# Patient Record
Sex: MALE | Race: WHITE | NOT HISPANIC OR LATINO | Employment: PART TIME | URBAN - METROPOLITAN AREA
[De-identification: names, ages, dates, MRNs, and addresses within clinical notes are randomized per-mention and may not be internally consistent; named-entity substitution may affect disease eponyms.]

---

## 2017-02-09 ENCOUNTER — HOSPITAL ENCOUNTER (OUTPATIENT)
Dept: LAB | Facility: HOSPITAL | Age: 47
Setting detail: SPECIMEN
Discharge: HOME | End: 2017-02-09
Attending: INTERNAL MEDICINE | Admitting: INTERNAL MEDICINE

## 2017-02-09 LAB
ALBUMIN SERPL-MCNC: 4.3 G/DL (ref 3.5–4.8)
ALBUMIN/GLOB SERPL: 1.5 {RATIO} (ref 1–1.7)
ALP SERPL-CCNC: 82 IU/L (ref 32–91)
ALT SERPL-CCNC: 65 IU/L (ref 17–63)
ANION GAP SERPL CALC-SCNC: 12.4 MMOL/L (ref 10–20)
AST SERPL-CCNC: 35 IU/L (ref 15–41)
BILIRUB SERPL-MCNC: 1.1 MG/DL (ref 0.3–1.2)
BUN SERPL-MCNC: 15 MG/DL (ref 8–20)
BUN/CREAT SERPL: 15 (ref 6.2–20.3)
CALCIUM SERPL-MCNC: 9.5 MG/DL (ref 8.9–10.3)
CHLORIDE SERPL-SCNC: 100 MMOL/L (ref 101–111)
CHOLEST SERPL-MCNC: 209 MG/DL
CHOLEST/HDLC SERPL: 5.1 {RATIO}
CONV CO2: 31 MMOL/L (ref 22–32)
CONV LDL CHOLESTEROL DIRECT: 121 MG/DL (ref 0–100)
CONV TOTAL PROTEIN: 7.1 G/DL (ref 6.1–7.9)
CREAT UR-MCNC: 1 MG/DL (ref 0.7–1.2)
GLOBULIN UR ELPH-MCNC: 2.8 G/DL (ref 2.5–3.8)
GLUCOSE SERPL-MCNC: 240 MG/DL (ref 65–99)
HDLC SERPL-MCNC: 41 MG/DL
LDLC/HDLC SERPL: 3 {RATIO}
LIPID INTERPRETATION: ABNORMAL
POTASSIUM SERPL-SCNC: 4.4 MMOL/L (ref 3.6–5.1)
SODIUM SERPL-SCNC: 139 MMOL/L (ref 136–144)
TRIGL SERPL-MCNC: 330 MG/DL
VLDLC SERPL CALC-MCNC: 47.8 MG/DL

## 2017-04-07 ENCOUNTER — HOSPITAL ENCOUNTER (OUTPATIENT)
Dept: LAB | Facility: HOSPITAL | Age: 47
Setting detail: SPECIMEN
Discharge: HOME | End: 2017-04-07
Attending: INTERNAL MEDICINE | Admitting: INTERNAL MEDICINE

## 2017-04-07 LAB
ALBUMIN SERPL-MCNC: 4.1 G/DL (ref 3.5–4.8)
ALBUMIN/GLOB SERPL: 1.6 {RATIO} (ref 1–1.7)
ALP SERPL-CCNC: 67 IU/L (ref 32–91)
ALT SERPL-CCNC: 38 IU/L (ref 17–63)
ANION GAP SERPL CALC-SCNC: 14.3 MMOL/L (ref 10–20)
AST SERPL-CCNC: 24 IU/L (ref 15–41)
BILIRUB SERPL-MCNC: 0.8 MG/DL (ref 0.3–1.2)
BUN SERPL-MCNC: 14 MG/DL (ref 8–20)
BUN/CREAT SERPL: 12.7 (ref 6.2–20.3)
CALCIUM SERPL-MCNC: 9.5 MG/DL (ref 8.9–10.3)
CHLORIDE SERPL-SCNC: 104 MMOL/L (ref 101–111)
CHOLEST SERPL-MCNC: 98 MG/DL
CHOLEST/HDLC SERPL: 2.6 {RATIO}
CONV CO2: 27 MMOL/L (ref 22–32)
CONV LDL CHOLESTEROL DIRECT: 48 MG/DL (ref 0–100)
CONV TOTAL PROTEIN: 6.6 G/DL (ref 6.1–7.9)
CREAT UR-MCNC: 1.1 MG/DL (ref 0.7–1.2)
GLOBULIN UR ELPH-MCNC: 2.5 G/DL (ref 2.5–3.8)
GLUCOSE SERPL-MCNC: 137 MG/DL (ref 65–99)
HDLC SERPL-MCNC: 38 MG/DL
LDLC/HDLC SERPL: 1.3 {RATIO}
LIPID INTERPRETATION: ABNORMAL
POTASSIUM SERPL-SCNC: 4.3 MMOL/L (ref 3.6–5.1)
SODIUM SERPL-SCNC: 141 MMOL/L (ref 136–144)
TRIGL SERPL-MCNC: 136 MG/DL
VLDLC SERPL CALC-MCNC: 12 MG/DL

## 2017-08-03 ENCOUNTER — HOSPITAL ENCOUNTER (OUTPATIENT)
Dept: LAB | Facility: HOSPITAL | Age: 47
Setting detail: SPECIMEN
Discharge: HOME | End: 2017-08-03
Attending: INTERNAL MEDICINE | Admitting: INTERNAL MEDICINE

## 2017-08-03 LAB
ALBUMIN SERPL-MCNC: 4.2 G/DL (ref 3.5–4.8)
ALBUMIN/GLOB SERPL: 1.6 {RATIO} (ref 1–1.7)
ALP SERPL-CCNC: 54 IU/L (ref 32–91)
ALT SERPL-CCNC: 31 IU/L (ref 17–63)
ANION GAP SERPL CALC-SCNC: 13.5 MMOL/L (ref 10–20)
AST SERPL-CCNC: 24 IU/L (ref 15–41)
BILIRUB SERPL-MCNC: 1 MG/DL (ref 0.3–1.2)
BUN SERPL-MCNC: 17 MG/DL (ref 8–20)
BUN/CREAT SERPL: 13.1 (ref 6.2–20.3)
CALCIUM SERPL-MCNC: 9.4 MG/DL (ref 8.9–10.3)
CHLORIDE SERPL-SCNC: 102 MMOL/L (ref 101–111)
CHOLEST SERPL-MCNC: 102 MG/DL
CHOLEST/HDLC SERPL: 2.8 {RATIO}
CONV CO2: 28 MMOL/L (ref 22–32)
CONV LDL CHOLESTEROL DIRECT: 52 MG/DL (ref 0–100)
CONV MICROALBUM.,U,RANDOM: 10 MG/L
CONV TOTAL PROTEIN: 6.8 G/DL (ref 6.1–7.9)
CREAT 24H UR-MCNC: 330.7 MG/DL
CREAT UR-MCNC: 1.3 MG/DL (ref 0.7–1.2)
GLOBULIN UR ELPH-MCNC: 2.6 G/DL (ref 2.5–3.8)
GLUCOSE SERPL-MCNC: 130 MG/DL (ref 65–99)
HDLC SERPL-MCNC: 37 MG/DL
LDLC/HDLC SERPL: 1.4 {RATIO}
LIPID INTERPRETATION: ABNORMAL
MICROALBUMIN/CREAT UR: 3 UG/MG
POTASSIUM SERPL-SCNC: 4.5 MMOL/L (ref 3.6–5.1)
SODIUM SERPL-SCNC: 139 MMOL/L (ref 136–144)
TRIGL SERPL-MCNC: 113 MG/DL
VLDLC SERPL CALC-MCNC: 13.9 MG/DL

## 2017-11-30 ENCOUNTER — HOSPITAL ENCOUNTER (OUTPATIENT)
Dept: ORTHOPEDIC SURGERY | Facility: CLINIC | Age: 47
Discharge: HOME | End: 2017-11-30
Attending: PODIATRIST | Admitting: PODIATRIST

## 2017-12-01 ENCOUNTER — HOSPITAL ENCOUNTER (OUTPATIENT)
Dept: LAB | Facility: HOSPITAL | Age: 47
Setting detail: SPECIMEN
Discharge: HOME | End: 2017-12-01
Attending: INTERNAL MEDICINE | Admitting: INTERNAL MEDICINE

## 2017-12-01 LAB
ALBUMIN SERPL-MCNC: 4.2 G/DL (ref 3.5–4.8)
ALBUMIN/GLOB SERPL: 1.7 {RATIO} (ref 1–1.7)
ALP SERPL-CCNC: 54 IU/L (ref 32–91)
ALT SERPL-CCNC: 36 IU/L (ref 17–63)
ANION GAP SERPL CALC-SCNC: 11.5 MMOL/L (ref 10–20)
AST SERPL-CCNC: 26 IU/L (ref 15–41)
BILIRUB SERPL-MCNC: 1.2 MG/DL (ref 0.3–1.2)
BUN SERPL-MCNC: 19 MG/DL (ref 8–20)
BUN/CREAT SERPL: 15.8 (ref 6.2–20.3)
CALCIUM SERPL-MCNC: 9.3 MG/DL (ref 8.9–10.3)
CHLORIDE SERPL-SCNC: 100 MMOL/L (ref 101–111)
CHOLEST SERPL-MCNC: 119 MG/DL
CHOLEST/HDLC SERPL: 2.8 {RATIO}
CONV CO2: 27 MMOL/L (ref 22–32)
CONV LDL CHOLESTEROL DIRECT: 67 MG/DL (ref 0–100)
CONV MICROALBUM.,U,RANDOM: 10 MG/L
CONV TOTAL PROTEIN: 6.7 G/DL (ref 6.1–7.9)
CREAT 24H UR-MCNC: 260.9 MG/DL
CREAT UR-MCNC: 1.2 MG/DL (ref 0.7–1.2)
GLOBULIN UR ELPH-MCNC: 2.5 G/DL (ref 2.5–3.8)
GLUCOSE SERPL-MCNC: 131 MG/DL (ref 65–99)
HDLC SERPL-MCNC: 43 MG/DL
LDLC/HDLC SERPL: 1.6 {RATIO}
LIPID INTERPRETATION: NORMAL
MICROALBUMIN/CREAT UR: 3.8 UG/MG
POTASSIUM SERPL-SCNC: 4.5 MMOL/L (ref 3.6–5.1)
SODIUM SERPL-SCNC: 134 MMOL/L (ref 136–144)
TRIGL SERPL-MCNC: 128 MG/DL
VLDLC SERPL CALC-MCNC: 9.1 MG/DL

## 2018-06-01 ENCOUNTER — HOSPITAL ENCOUNTER (OUTPATIENT)
Dept: LAB | Facility: HOSPITAL | Age: 48
Setting detail: SPECIMEN
Discharge: HOME | End: 2018-06-01
Attending: INTERNAL MEDICINE | Admitting: INTERNAL MEDICINE

## 2018-06-01 LAB
ALBUMIN SERPL-MCNC: 4 G/DL (ref 3.5–4.8)
ALBUMIN/GLOB SERPL: 1.7 {RATIO} (ref 1–1.7)
ALP SERPL-CCNC: 57 IU/L (ref 32–91)
ALT SERPL-CCNC: 46 IU/L (ref 17–63)
ANION GAP SERPL CALC-SCNC: 10 MMOL/L (ref 10–20)
AST SERPL-CCNC: 29 IU/L (ref 15–41)
BILIRUB SERPL-MCNC: 0.7 MG/DL (ref 0.3–1.2)
BUN SERPL-MCNC: 15 MG/DL (ref 8–20)
BUN/CREAT SERPL: 12.5 (ref 6.2–20.3)
CALCIUM SERPL-MCNC: 9.1 MG/DL (ref 8.9–10.3)
CHLORIDE SERPL-SCNC: 101 MMOL/L (ref 101–111)
CHOLEST SERPL-MCNC: 105 MG/DL
CHOLEST/HDLC SERPL: 3.3 {RATIO}
CONV CO2: 28 MMOL/L (ref 22–32)
CONV LDL CHOLESTEROL DIRECT: 52 MG/DL (ref 0–100)
CONV MICROALBUM.,U,RANDOM: 7 MG/L
CONV TOTAL PROTEIN: 6.4 G/DL (ref 6.1–7.9)
CREAT 24H UR-MCNC: 243.9 MG/DL
CREAT UR-MCNC: 1.2 MG/DL (ref 0.7–1.2)
GLOBULIN UR ELPH-MCNC: 2.4 G/DL (ref 2.5–3.8)
GLUCOSE SERPL-MCNC: 164 MG/DL (ref 65–99)
HDLC SERPL-MCNC: 32 MG/DL
LDLC/HDLC SERPL: 1.7 {RATIO}
LIPID INTERPRETATION: ABNORMAL
MICROALBUMIN/CREAT UR: 2.9 UG/MG
POTASSIUM SERPL-SCNC: 4 MMOL/L (ref 3.6–5.1)
SODIUM SERPL-SCNC: 135 MMOL/L (ref 136–144)
TRIGL SERPL-MCNC: 183 MG/DL
VLDLC SERPL CALC-MCNC: 21.2 MG/DL

## 2018-08-30 ENCOUNTER — HOSPITAL ENCOUNTER (OUTPATIENT)
Dept: LAB | Facility: HOSPITAL | Age: 48
Setting detail: SPECIMEN
Discharge: HOME | End: 2018-08-30
Attending: NURSE PRACTITIONER | Admitting: NURSE PRACTITIONER

## 2018-08-30 LAB
ALBUMIN SERPL-MCNC: 4.2 G/DL (ref 3.5–4.8)
ALBUMIN/GLOB SERPL: 1.6 {RATIO} (ref 1–1.7)
ALP SERPL-CCNC: 60 IU/L (ref 32–91)
ALT SERPL-CCNC: 26 IU/L (ref 17–63)
ANION GAP SERPL CALC-SCNC: 12.1 MMOL/L (ref 10–20)
AST SERPL-CCNC: 22 IU/L (ref 15–41)
BILIRUB SERPL-MCNC: 1.1 MG/DL (ref 0.3–1.2)
BUN SERPL-MCNC: 17 MG/DL (ref 8–20)
BUN/CREAT SERPL: 13.1 (ref 6.2–20.3)
CALCIUM SERPL-MCNC: 9.4 MG/DL (ref 8.9–10.3)
CHLORIDE SERPL-SCNC: 104 MMOL/L (ref 101–111)
CONV CO2: 27 MMOL/L (ref 22–32)
CONV TOTAL PROTEIN: 6.8 G/DL (ref 6.1–7.9)
CREAT UR-MCNC: 1.3 MG/DL (ref 0.7–1.2)
GLOBULIN UR ELPH-MCNC: 2.6 G/DL (ref 2.5–3.8)
GLUCOSE SERPL-MCNC: 184 MG/DL (ref 65–99)
POTASSIUM SERPL-SCNC: 4.1 MMOL/L (ref 3.6–5.1)
SODIUM SERPL-SCNC: 139 MMOL/L (ref 136–144)

## 2019-03-01 ENCOUNTER — HOSPITAL ENCOUNTER (OUTPATIENT)
Dept: LAB | Facility: HOSPITAL | Age: 49
Setting detail: SPECIMEN
Discharge: HOME | End: 2019-03-01
Attending: INTERNAL MEDICINE | Admitting: INTERNAL MEDICINE

## 2019-03-01 LAB
ALBUMIN SERPL-MCNC: 4.3 G/DL (ref 3.5–4.8)
ALBUMIN/GLOB SERPL: 1.6 {RATIO} (ref 1–1.7)
ALP SERPL-CCNC: 60 IU/L (ref 32–91)
ALT SERPL-CCNC: 35 IU/L (ref 17–63)
ANION GAP SERPL CALC-SCNC: 13.2 MMOL/L (ref 10–20)
AST SERPL-CCNC: 22 IU/L (ref 15–41)
BILIRUB SERPL-MCNC: 0.7 MG/DL (ref 0.3–1.2)
BUN SERPL-MCNC: 18 MG/DL (ref 8–20)
BUN/CREAT SERPL: 15 (ref 6.2–20.3)
CALCIUM SERPL-MCNC: 9.2 MG/DL (ref 8.9–10.3)
CHLORIDE SERPL-SCNC: 103 MMOL/L (ref 101–111)
CONV CO2: 26 MMOL/L (ref 22–32)
CONV TOTAL PROTEIN: 7 G/DL (ref 6.1–7.9)
CREAT UR-MCNC: 1.2 MG/DL (ref 0.7–1.2)
GLOBULIN UR ELPH-MCNC: 2.7 G/DL (ref 2.5–3.8)
GLUCOSE SERPL-MCNC: 157 MG/DL (ref 65–99)
POTASSIUM SERPL-SCNC: 4.2 MMOL/L (ref 3.6–5.1)
SODIUM SERPL-SCNC: 138 MMOL/L (ref 136–144)

## 2019-06-27 RX ORDER — SITAGLIPTIN AND METFORMIN HYDROCHLORIDE 1000; 50 MG/1; MG/1
TABLET, FILM COATED, EXTENDED RELEASE ORAL
Qty: 60 TABLET | Refills: 4 | Status: SHIPPED | OUTPATIENT
Start: 2019-06-27 | End: 2019-12-02 | Stop reason: SDUPTHER

## 2019-09-10 DIAGNOSIS — E78.1 HYPERTRIGLYCERIDEMIA: ICD-10-CM

## 2019-09-10 DIAGNOSIS — E78.5 HYPERLIPIDEMIA, UNSPECIFIED HYPERLIPIDEMIA TYPE: Primary | ICD-10-CM

## 2019-09-10 DIAGNOSIS — E11.65 UNCONTROLLED TYPE 2 DIABETES MELLITUS WITH HYPERGLYCEMIA (HCC): ICD-10-CM

## 2019-09-10 PROBLEM — Z23 NEED FOR OTHER PROPHYLACTIC VACCINATION AND INOCULATION AGAINST SINGLE DISEASES: Status: ACTIVE | Noted: 2018-07-26

## 2019-09-10 PROBLEM — M79.672 FOOT PAIN, LEFT: Status: ACTIVE | Noted: 2017-11-30

## 2019-09-10 PROBLEM — B35.1 ONYCHOMYCOSIS: Status: ACTIVE | Noted: 2017-11-30

## 2019-09-10 PROBLEM — G57.60 PLANTAR NEUROMA: Status: ACTIVE | Noted: 2017-11-30

## 2019-09-10 PROBLEM — R22.9 SUBCUTANEOUS NODULE: Status: ACTIVE | Noted: 2018-03-01

## 2019-09-10 PROBLEM — R21 RASH, SKIN: Status: ACTIVE | Noted: 2018-10-15

## 2019-09-19 RX ORDER — MAGNESIUM 200 MG
TABLET ORAL
Qty: 90 EACH | Refills: 2 | Status: SHIPPED | OUTPATIENT
Start: 2019-09-19 | End: 2020-06-16

## 2019-10-29 RX ORDER — ATORVASTATIN CALCIUM 10 MG/1
TABLET, FILM COATED ORAL
Qty: 90 TABLET | Refills: 0 | Status: SHIPPED | OUTPATIENT
Start: 2019-10-29 | End: 2020-01-29

## 2019-11-26 ENCOUNTER — LAB (OUTPATIENT)
Dept: LAB | Facility: HOSPITAL | Age: 49
End: 2019-11-26

## 2019-11-26 DIAGNOSIS — E11.65 UNCONTROLLED TYPE 2 DIABETES MELLITUS WITH HYPERGLYCEMIA (HCC): ICD-10-CM

## 2019-11-26 DIAGNOSIS — E78.1 HYPERTRIGLYCERIDEMIA: ICD-10-CM

## 2019-11-26 DIAGNOSIS — E78.5 HYPERLIPIDEMIA, UNSPECIFIED HYPERLIPIDEMIA TYPE: ICD-10-CM

## 2019-11-26 LAB
ALBUMIN SERPL-MCNC: 4.7 G/DL (ref 3.5–5.2)
ALBUMIN UR-MCNC: 1.8 MG/DL
ALBUMIN/GLOB SERPL: 1.7 G/DL
ALP SERPL-CCNC: 66 U/L (ref 39–117)
ALT SERPL W P-5'-P-CCNC: 41 U/L (ref 1–41)
ANION GAP SERPL CALCULATED.3IONS-SCNC: 10.1 MMOL/L (ref 5–15)
AST SERPL-CCNC: 22 U/L (ref 1–40)
BILIRUB SERPL-MCNC: 0.7 MG/DL (ref 0.2–1.2)
BUN BLD-MCNC: 17 MG/DL (ref 6–20)
BUN/CREAT SERPL: 15.2 (ref 7–25)
CALCIUM SPEC-SCNC: 9.2 MG/DL (ref 8.6–10.5)
CHLORIDE SERPL-SCNC: 99 MMOL/L (ref 98–107)
CHOLEST SERPL-MCNC: 107 MG/DL (ref 0–200)
CO2 SERPL-SCNC: 27.9 MMOL/L (ref 22–29)
CREAT BLD-MCNC: 1.12 MG/DL (ref 0.76–1.27)
CREAT UR-MCNC: 165.7 MG/DL
GFR SERPL CREATININE-BSD FRML MDRD: 69 ML/MIN/1.73
GLOBULIN UR ELPH-MCNC: 2.8 GM/DL
GLUCOSE BLD-MCNC: 171 MG/DL (ref 65–99)
HBA1C MFR BLD: 7.5 % (ref 3.5–5.6)
HDLC SERPL-MCNC: 38 MG/DL (ref 40–60)
LDLC SERPL CALC-MCNC: 34 MG/DL (ref 0–100)
LDLC/HDLC SERPL: 0.88 {RATIO}
MICROALBUMIN/CREAT UR: 10.9 MG/G
POTASSIUM BLD-SCNC: 4.6 MMOL/L (ref 3.5–5.2)
PROT SERPL-MCNC: 7.5 G/DL (ref 6–8.5)
SODIUM BLD-SCNC: 137 MMOL/L (ref 136–145)
TRIGL SERPL-MCNC: 177 MG/DL (ref 0–150)
VLDLC SERPL-MCNC: 35.4 MG/DL (ref 5–40)

## 2019-11-26 PROCEDURE — 82570 ASSAY OF URINE CREATININE: CPT

## 2019-11-26 PROCEDURE — 82043 UR ALBUMIN QUANTITATIVE: CPT

## 2019-11-26 PROCEDURE — 80061 LIPID PANEL: CPT

## 2019-11-26 PROCEDURE — 80053 COMPREHEN METABOLIC PANEL: CPT

## 2019-11-26 PROCEDURE — 83036 HEMOGLOBIN GLYCOSYLATED A1C: CPT

## 2019-11-26 PROCEDURE — 36415 COLL VENOUS BLD VENIPUNCTURE: CPT

## 2019-12-03 ENCOUNTER — TELEPHONE (OUTPATIENT)
Dept: FAMILY MEDICINE CLINIC | Facility: CLINIC | Age: 49
End: 2019-12-03

## 2019-12-03 DIAGNOSIS — Z12.11 SCREENING FOR COLON CANCER: Primary | ICD-10-CM

## 2019-12-03 RX ORDER — SITAGLIPTIN AND METFORMIN HYDROCHLORIDE 1000; 50 MG/1; MG/1
TABLET, FILM COATED, EXTENDED RELEASE ORAL
Qty: 60 TABLET | Refills: 0 | Status: SHIPPED | OUTPATIENT
Start: 2019-12-03 | End: 2019-12-30

## 2019-12-03 NOTE — TELEPHONE ENCOUNTER
Patient scheduled a physical with you in March. He is 48 y/o now and would like to proceed with getting a colonoscopy and is asking if you would order this now rather than waiting till March. Thank you.

## 2019-12-06 ENCOUNTER — OFFICE VISIT (OUTPATIENT)
Dept: ENDOCRINOLOGY | Facility: CLINIC | Age: 49
End: 2019-12-06

## 2019-12-06 VITALS
HEART RATE: 78 BPM | DIASTOLIC BLOOD PRESSURE: 75 MMHG | BODY MASS INDEX: 29.33 KG/M2 | WEIGHT: 198 LBS | SYSTOLIC BLOOD PRESSURE: 108 MMHG | HEIGHT: 69 IN | OXYGEN SATURATION: 98 %

## 2019-12-06 DIAGNOSIS — E11.65 UNCONTROLLED TYPE 2 DIABETES MELLITUS WITH HYPERGLYCEMIA (HCC): Primary | ICD-10-CM

## 2019-12-06 DIAGNOSIS — E78.2 MIXED HYPERLIPIDEMIA: ICD-10-CM

## 2019-12-06 DIAGNOSIS — E55.9 VITAMIN D DEFICIENCY: ICD-10-CM

## 2019-12-06 PROCEDURE — 99214 OFFICE O/P EST MOD 30 MIN: CPT | Performed by: INTERNAL MEDICINE

## 2019-12-06 PROCEDURE — 90471 IMMUNIZATION ADMIN: CPT | Performed by: INTERNAL MEDICINE

## 2019-12-06 PROCEDURE — 90674 CCIIV4 VAC NO PRSV 0.5 ML IM: CPT | Performed by: INTERNAL MEDICINE

## 2019-12-06 NOTE — PROGRESS NOTES
Endocrine Progress Note Outpatient     Patient Care Team:  Provider, No Known as PCP - General    Chief Complaint: Follow-up type 2 diabetes    HPI: 49-year-old male with history of type 2 diabetes, hyperlipidemia and vitamin D deficiency is here for follow-up.  For type 2 diabetes he is currently on Janumet XR 50/1000, 2 tablets daily.  He was working on his diet and activity for a while but looks like he has gone off the schedule.  He has not been checking his blood sugars at home.  He has been doing his eye exams on regular basis.  He has not done his flu vaccine this season yet.  Hyperlipidemia: On atorvastatin  Vitamin D deficiency: On replacement.    Past Medical History:   Diagnosis Date   • Hyperlipidemia    • LEANNA (obstructive sleep apnea)    • Type 2 diabetes mellitus (CMS/[facility])        Social History     Socioeconomic History   • Marital status:      Spouse name: Not on file   • Number of children: Not on file   • Years of education: Not on file   • Highest education level: Not on file   Tobacco Use   • Smoking status: Never Smoker   Substance and Sexual Activity   • Alcohol use: No     Frequency: Never       Family History   Problem Relation Age of Onset   • Lupus Mother    • Hypertension Mother    • Cancer Mother         lung / breast / skin   • Diabetes Father    • Cancer Father         myeloma    • Diabetes Brother        No Known Allergies    ROS:   Constitutional:  Denies fatigue, tiredness.    Eyes:  Denies change in visual acuity   HENT:  Denies nasal congestion or sore throat   Respiratory: denies cough, shortness of breath.   Cardiovascular:  denies chest pain, edema   GI:  Denies abdominal pain, nausea, vomiting.   Musculoskeletal:  Denies back pain or joint pain   Integument:  Denies dry skin and rash   Neurologic:  Denies headache, focal weakness or sensory changes   Endocrine:  Denies polyuria or polydipsia   Psychiatric:  Denies depression or anxiety      Vitals:    12/06/19 0918   BP:  108/75   Pulse: 78   SpO2: 98%       Physical Exam:  GEN: NAD, conversant  EYES: EOMI, PERRL, no conjunctival erythema  NECK: no thyromegaly, full ROM   CV: RRR, no murmurs/rubs/gallops, no peripheral edema  LUNG: CTAB, no wheezes/rales/ronchi  SKIN: no rashes, no acanthosis  MSK: no deformities, full ROM of all extremities  NEURO: no tremors, DTR normal  PSYCH: AOX3, appropriate mood, affect normal      Results Review:     I reviewed the patient's new clinical results.    Lab Results   Component Value Date    HGBA1C 7.5 (H) 11/26/2019      Lab Results   Component Value Date    GLUCOSE 171 (H) 11/26/2019    BUN 17 11/26/2019    CREATININE 1.12 11/26/2019    EGFRIFNONA 69 11/26/2019    BCR 15.2 11/26/2019    K 4.6 11/26/2019    CO2 27.9 11/26/2019    CALCIUM 9.2 11/26/2019    ALBUMIN 4.70 11/26/2019    LABIL2 1.6 03/01/2019    AST 22 11/26/2019    ALT 41 11/26/2019    CHOL 107 11/26/2019    TRIG 177 (H) 11/26/2019    LDL 34 11/26/2019    HDL 38 (L) 11/26/2019         Medication Review: Reviewed.       Current Outpatient Medications:   •  atorvastatin (LIPITOR) 10 MG tablet, Take one daily (KEEP SCHEDULED APPOINTMENT FOR FURTHER REFILLS), Disp: 90 tablet, Rfl: 0  •  Cholecalciferol (VITAMIN D3) 125 MCG (5000 UT) capsule capsule, VITAMIN D3 5000 UNIT CAPS, Disp: , Rfl:   •  Cyanocobalamin (VITAMIN B-12) 1000 MCG sublingual tablet, PLACE ONE TABLET UNDER THE TONGUE DAILY, Disp: 90 each, Rfl: 2  •  glucose blood (ONE TOUCH ULTRA TEST) test strip, ONETOUCH ULTRA BLUE STRP, Disp: , Rfl:   •  JANUMET XR  MG tablet, TAKE ONE TABLET BY MOUTH TWICE A DAY, Disp: 60 tablet, Rfl: 0      Assessment/Plan   1.  Diabetes mellitus type 2: Uncontrolled with A1c now at 7.5%.  Unfortunately he has not been following his diet and activity which I have encouraged him to resume and also at this time I will add Jardiance 10 mg p.o. daily.  Side effects of dehydration and yeast infection discussed with him.  He is advised to drink  plenty of water as well as keep the penile area clean and dry.  Will follow blood sugars and A1c.  2.  Hyperlipidemia: Well-controlled, continue current medication  3.  Vitamin D deficiency: On vitamin D supplementation.            Ino Jean MD FACE.    Much of the above report is an electronic transcription/translation of the spoken language to printed text using Dragon Software. As such, the subtleties and finesse of the spoken language may permit erroneous, or at times, nonsensical words or phrases to be inadvertently transcribed; thus changes may be made at a later date to rectify these errors.

## 2019-12-06 NOTE — PATIENT INSTRUCTIONS
Start Jardiance 10 mg p.o. daily  Please please drink plenty of water and keep the genital area clean and dry.  Follow-up in 4 months  Annual eye exam and flu vaccine  Always keep glucose source with you in case of low blood sugar.

## 2019-12-10 ENCOUNTER — TELEPHONE (OUTPATIENT)
Dept: FAMILY MEDICINE CLINIC | Facility: CLINIC | Age: 49
End: 2019-12-10

## 2019-12-10 NOTE — TELEPHONE ENCOUNTER
Patient called stating that he is needing a new cpap machine and soclean machine. Patient states that he was told by [employer] that his machine was old and to request new script be sent. Patient is asking if scripts can be sent to [facility].

## 2019-12-18 ENCOUNTER — OFFICE VISIT (OUTPATIENT)
Dept: FAMILY MEDICINE CLINIC | Facility: CLINIC | Age: 49
End: 2019-12-18

## 2019-12-18 VITALS
WEIGHT: 195.8 LBS | BODY MASS INDEX: 29 KG/M2 | HEART RATE: 80 BPM | RESPIRATION RATE: 20 BRPM | TEMPERATURE: 98 F | DIASTOLIC BLOOD PRESSURE: 92 MMHG | OXYGEN SATURATION: 97 % | SYSTOLIC BLOOD PRESSURE: 148 MMHG | HEIGHT: 69 IN

## 2019-12-18 DIAGNOSIS — G89.29 CHRONIC BILATERAL LOW BACK PAIN WITHOUT SCIATICA: Primary | ICD-10-CM

## 2019-12-18 DIAGNOSIS — Z80.9: ICD-10-CM

## 2019-12-18 DIAGNOSIS — M54.50 CHRONIC BILATERAL LOW BACK PAIN WITHOUT SCIATICA: Primary | ICD-10-CM

## 2019-12-18 DIAGNOSIS — Z80.9 MATERNAL FAMILY HISTORY OF CANCER: ICD-10-CM

## 2019-12-18 PROCEDURE — 99213 OFFICE O/P EST LOW 20 MIN: CPT | Performed by: INTERNAL MEDICINE

## 2019-12-18 RX ORDER — CYCLOBENZAPRINE HCL 10 MG
10 TABLET ORAL
Qty: 30 TABLET | Refills: 1 | Status: SHIPPED | OUTPATIENT
Start: 2019-12-18 | End: 2020-08-14

## 2019-12-18 NOTE — PROGRESS NOTES
Subjective   Alexx Workman is a 49 y.o. male.     Pt is here for c/o worsening back pain  Mostly on right side  Doesn't recall any particular inciting event  Uses ice and heat and otc meds  All help a little temporarily    Pt also worries about cancer  Apparently runs in the family  Though with different types       The following portions of the patient's history were reviewed and updated as appropriate: allergies, current medications, past family history, past medical history, past social history, past surgical history and problem list.    Review of Systems   Constitutional: Negative for fatigue and fever.   HENT: Negative for congestion, ear pain, rhinorrhea and sore throat.    Eyes: Negative for blurred vision and itching.   Respiratory: Negative for cough and shortness of breath.    Cardiovascular: Negative for chest pain and palpitations.   Gastrointestinal: Negative for abdominal pain, diarrhea and vomiting.   Endocrine: Negative for polydipsia and polyuria.   Genitourinary: Negative for dysuria, frequency, hematuria and urgency.   Musculoskeletal: Positive for back pain. Negative for joint swelling and myalgias.   Skin: Negative for rash and skin lesions.   Neurological: Negative for dizziness, numbness and headache.   Psychiatric/Behavioral: Negative for depressed mood. The patient is not nervous/anxious.          Current Outpatient Medications:   •  atorvastatin (LIPITOR) 10 MG tablet, Take one daily (KEEP SCHEDULED APPOINTMENT FOR FURTHER REFILLS), Disp: 90 tablet, Rfl: 0  •  Cholecalciferol (VITAMIN D3) 125 MCG (5000 UT) capsule capsule, VITAMIN D3 5000 UNIT CAPS, Disp: , Rfl:   •  Cyanocobalamin (VITAMIN B-12) 1000 MCG sublingual tablet, PLACE ONE TABLET UNDER THE TONGUE DAILY, Disp: 90 each, Rfl: 2  •  Empagliflozin (JARDIANCE) 10 MG tablet, Take 10 mg by mouth Daily., Disp: 30 tablet, Rfl: 4  •  glucose blood (ONE TOUCH ULTRA TEST) test strip, ONETOUCH ULTRA BLUE STRP, Disp: , Rfl:   •  JANUMET XR  " MG tablet, TAKE ONE TABLET BY MOUTH TWICE A DAY, Disp: 60 tablet, Rfl: 0    Objective   /92 (BP Location: Right arm, Patient Position: Sitting, Cuff Size: Adult)   Pulse 80   Temp 98 °F (36.7 °C) (Oral)   Resp 20   Ht 175.3 cm (69\")   Wt 88.8 kg (195 lb 12.8 oz)   SpO2 97%   BMI 28.91 kg/m²   Physical Exam   Constitutional: He is oriented to person, place, and time. He appears well-developed and well-nourished. No distress.   HENT:   Head: Normocephalic and atraumatic.   Mouth/Throat: Oropharynx is clear and moist. No oropharyngeal exudate.   Eyes: Conjunctivae and EOM are normal. Right eye exhibits no discharge. Left eye exhibits no discharge. No scleral icterus.   Neck: Normal range of motion. Neck supple. No thyromegaly present.   Cardiovascular: Normal rate, regular rhythm and normal heart sounds. Exam reveals no gallop and no friction rub.   No murmur heard.  Pulmonary/Chest: Effort normal and breath sounds normal. No respiratory distress. He has no wheezes. He has no rales.   Musculoskeletal: He exhibits no edema or deformity.   Lymphadenopathy:     He has no cervical adenopathy.   Neurological: He is alert and oriented to person, place, and time. No cranial nerve deficit.   Skin: Skin is warm and dry. No rash noted. He is not diaphoretic. No erythema.   Psychiatric: He has a normal mood and affect. His behavior is normal. Thought content normal.   Vitals reviewed.        Assessment/Plan   Problems Addressed this Visit     None      Visit Diagnoses     Chronic bilateral low back pain without sciatica    -  Primary    Relevant Orders    Ambulatory Referral to Physical Therapy Evaluate and treat    Maternal family history of cancer        Relevant Orders    Ambulatory Referral to Oncology    Paternal family history of cancer        Relevant Orders    Ambulatory Referral to Oncology          Try PT and flexeril  Back pain does affect sleep  If no better, xrays and possibly MRI  I do not see " any consistent patterns to suggest cancer is hereditary  But will refer to onc for further eval       Procedures

## 2019-12-30 RX ORDER — SITAGLIPTIN AND METFORMIN HYDROCHLORIDE 1000; 50 MG/1; MG/1
TABLET, FILM COATED, EXTENDED RELEASE ORAL
Qty: 60 TABLET | Refills: 4 | Status: SHIPPED | OUTPATIENT
Start: 2019-12-30 | End: 2020-06-01

## 2020-01-07 ENCOUNTER — TREATMENT (OUTPATIENT)
Dept: PHYSICAL THERAPY | Facility: CLINIC | Age: 50
End: 2020-01-07

## 2020-01-07 DIAGNOSIS — M54.50 CHRONIC MIDLINE LOW BACK PAIN WITHOUT SCIATICA: Primary | ICD-10-CM

## 2020-01-07 DIAGNOSIS — G89.29 CHRONIC MIDLINE LOW BACK PAIN WITHOUT SCIATICA: Primary | ICD-10-CM

## 2020-01-07 PROCEDURE — 97110 THERAPEUTIC EXERCISES: CPT | Performed by: PHYSICAL THERAPIST

## 2020-01-07 PROCEDURE — 97161 PT EVAL LOW COMPLEX 20 MIN: CPT | Performed by: PHYSICAL THERAPIST

## 2020-01-07 PROCEDURE — 97140 MANUAL THERAPY 1/> REGIONS: CPT | Performed by: PHYSICAL THERAPIST

## 2020-01-07 NOTE — PROGRESS NOTES
Physical Therapy Initial Evaluation and Plan of Care      Patient: Alexx Workman   : 1970  Diagnosis/ICD-10 Code:  Chronic midline low back pain without sciatica [M54.5, G89.29]  Referring practitioner: Elaine Garcia MD  Date of Initial Visit: 2020  Today's Date: 2020  Patient seen for 1 sessions           Subjective Questionnaire: Oswestry: 6%       Subjective Evaluation    History of Present Illness  Mechanism of injury: Patient presents to physical therapy with chief complaint of lower back pain that has been present for the past year.  Reports that he saw a chiropractor for initial treatment and reports that s/s did not improve.  Reports over the past few months he has noticed sharp pain in right side of lower back when turning to the right.  Reports this sharp pain occurs daily.  Patient reports that these symptoms have not kept him from completing any activity, however has limited him in some capacity.  Denies N&T in BLE's.      Pain  Current pain ratin  At worst pain ratin  Quality: sharp and dull ache  Relieving factors: heat and medications (muscle relaxor )  Aggravating factors: ambulation, movement and lifting  Progression: no change    Treatments  Previous treatment: chiropractic  Patient Goals  Patient goals for therapy: decreased pain and increased strength             Objective       Palpation     Right   Hypertonic in the quadratus lumborum.   Muscle spasm in the quadratus lumborum. Tenderness of the quadratus lumborum.     Tenderness     Right Hip   Tenderness in the PSIS.     Active Range of Motion     Additional Active Range of Motion Details  Lumbar flexion limited 25% and painful near right QL   All other motions wnl     Strength/Myotome Testing     Lumbar   Left   Normal strength    Right   Normal strength    Muscle Activation   Patient able to activate right multifidus.     Tests     Lumbar     Right   Negative passive SLR and quadrant.     Right Pelvic  Girdle/Sacrum   Negative: sacrum compression.          Assessment & Plan     Assessment  Impairments: abnormal muscle firing, abnormal or restricted ROM, impaired physical strength, lacks appropriate home exercise program and pain with function  Assessment details: Patient presents to physical therapy with s/s congruent with MD diagnosis of low back pain.  Patient demonstrates restriction lumbar AROM, reports pain to palpation over right Quadratus Lumborum, and demonstrates abnormal muscle firing of right multifidi and left gluteus fadumo.  Patient would benefit from physical therapy intervention in order to address these deficits so that he may return to work and ADL's without limitation.   Prognosis: good  Functional Limitations: lifting, pulling, pushing and standing  Goals  Plan Goals: In two weeks, patient will report at least 25% reduction in pain level.    In two weeks, patient will demonstrate full lumbar flexion AROM.      In four weeks, patient will report standing/working for 6 hours without pain in lumbar spine.  In four weeks, patient will demonstrate decreased perceived disability by decreasing score on Oswestry by at least 50%.  (6% on eval - goal 3%)      Plan  Therapy options: will be seen for skilled physical therapy services  Planned modality interventions: cryotherapy, TENS and thermotherapy (hydrocollator packs)  Planned therapy interventions: manual therapy, neuromuscular re-education, soft tissue mobilization, spinal/joint mobilization, strengthening, stretching, therapeutic activities, joint mobilization, home exercise program, functional ROM exercises and abdominal trunk stabilization  Frequency: 2x week  Duration in visits: 12        Manual Therapy:    10     mins  68883;  Therapeutic Exercise:    15     mins  26953;     Neuromuscular Drew:        mins  52703;    Therapeutic Activity:          mins  87752;     Gait Training:           mins  49100;     Ultrasound:          mins  46685;     Electrical Stimulation:         mins  58978 ( );  Dry Needling          mins self-pay    Timed Treatment:   25   mins   Total Treatment:     50   mins    PT SIGNATURE: Ariel Teran, GUANACO   DATE TREATMENT INITIATED: 1/7/2020    Initial Certification  Certification Period: 4/6/2020  I certify that the therapy services are furnished while this patient is under my care.  The services outlined above are required by this patient, and will be reviewed every 90 days.     PHYSICIAN: Elaine Garcia MD      DATE:     Please sign and return via fax to [phone].. Thank you, [facility].

## 2020-01-09 ENCOUNTER — TREATMENT (OUTPATIENT)
Dept: PHYSICAL THERAPY | Facility: CLINIC | Age: 50
End: 2020-01-09

## 2020-01-09 DIAGNOSIS — M54.50 CHRONIC MIDLINE LOW BACK PAIN WITHOUT SCIATICA: Primary | ICD-10-CM

## 2020-01-09 DIAGNOSIS — G89.29 CHRONIC MIDLINE LOW BACK PAIN WITHOUT SCIATICA: Primary | ICD-10-CM

## 2020-01-09 PROCEDURE — 97014 ELECTRIC STIMULATION THERAPY: CPT | Performed by: PHYSICAL THERAPIST

## 2020-01-09 PROCEDURE — 97140 MANUAL THERAPY 1/> REGIONS: CPT | Performed by: PHYSICAL THERAPIST

## 2020-01-09 PROCEDURE — 97110 THERAPEUTIC EXERCISES: CPT | Performed by: PHYSICAL THERAPIST

## 2020-01-09 NOTE — PROGRESS NOTES
Physical Therapy Daily Progress Note    VISIT#: 2    Subjective   Alexx Workman reports okay after eval.  HEP going well.  R-sided LBP with R thoracolumbar rotation.  No pain at rest.    Objective     See Exercise, Manual, and Modality Logs for complete treatment.         Assessment/Plan   Upper/mid-thoracic hypomobility with PA glides.  R-sided LBP better after deep manual R QL release since it was TTP at start of manual (tenderness reduced with cont of manual).  No pain with any exercise.  Modalities felt good.  No complications.      Progress per Plan of Care           Timed:         Manual Therapy:    19     mins  67340;     Therapeutic Exercise:    13     mins  55491;         Un-Timed:  Electrical Stimulation:    15     mins  60636 ( );    Timed Treatment:   32   mins   Total Treatment:     47   mins    Bladimir Justice PT  [place] License # [id number]  Physical Therapist

## 2020-01-10 ENCOUNTER — CONSULT (OUTPATIENT)
Dept: ONCOLOGY | Facility: CLINIC | Age: 50
End: 2020-01-10

## 2020-01-10 ENCOUNTER — OFFICE VISIT (OUTPATIENT)
Dept: LAB | Facility: HOSPITAL | Age: 50
End: 2020-01-10

## 2020-01-10 VITALS
HEART RATE: 88 BPM | WEIGHT: 195.8 LBS | RESPIRATION RATE: 20 BRPM | BODY MASS INDEX: 28.03 KG/M2 | TEMPERATURE: 97.9 F | SYSTOLIC BLOOD PRESSURE: 118 MMHG | HEIGHT: 70 IN | DIASTOLIC BLOOD PRESSURE: 87 MMHG

## 2020-01-10 DIAGNOSIS — E78.5 HYPERLIPIDEMIA, UNSPECIFIED HYPERLIPIDEMIA TYPE: Primary | ICD-10-CM

## 2020-01-10 DIAGNOSIS — Z15.89 GENETIC PREDISPOSITION TO DISEASE: Primary | ICD-10-CM

## 2020-01-10 LAB
BASOPHILS # BLD AUTO: 0.02 10*3/MM3 (ref 0–0.2)
BASOPHILS NFR BLD AUTO: 0.3 % (ref 0–1.5)
DEPRECATED RDW RBC AUTO: 40.9 FL (ref 37–54)
EOSINOPHIL # BLD AUTO: 0.2 10*3/MM3 (ref 0–0.4)
EOSINOPHIL NFR BLD AUTO: 2.9 % (ref 0.3–6.2)
ERYTHROCYTE [DISTWIDTH] IN BLOOD BY AUTOMATED COUNT: 12.7 % (ref 12.3–15.4)
HCT VFR BLD AUTO: 45.9 % (ref 37.5–51)
HGB BLD-MCNC: 16 G/DL (ref 13–17.7)
LYMPHOCYTES # BLD AUTO: 1.78 10*3/MM3 (ref 0.7–3.1)
LYMPHOCYTES NFR BLD AUTO: 26.2 % (ref 19.6–45.3)
MCH RBC QN AUTO: 31.4 PG (ref 26.6–33)
MCHC RBC AUTO-ENTMCNC: 34.9 G/DL (ref 31.5–35.7)
MCV RBC AUTO: 90.2 FL (ref 79–97)
MONOCYTES # BLD AUTO: 0.46 10*3/MM3 (ref 0.1–0.9)
MONOCYTES NFR BLD AUTO: 6.8 % (ref 5–12)
NEUTROPHILS # BLD AUTO: 4.34 10*3/MM3 (ref 1.7–7)
NEUTROPHILS NFR BLD AUTO: 63.8 % (ref 42.7–76)
PLATELET # BLD AUTO: 198 10*3/MM3 (ref 140–450)
PMV BLD AUTO: 9.6 FL (ref 6–12)
RBC # BLD AUTO: 5.09 10*6/MM3 (ref 4.14–5.8)
WBC NRBC COR # BLD: 6.8 10*3/MM3 (ref 3.4–10.8)

## 2020-01-10 PROCEDURE — 36415 COLL VENOUS BLD VENIPUNCTURE: CPT

## 2020-01-10 PROCEDURE — 99204 OFFICE O/P NEW MOD 45 MIN: CPT | Performed by: INTERNAL MEDICINE

## 2020-01-10 PROCEDURE — 85025 COMPLETE CBC W/AUTO DIFF WBC: CPT

## 2020-01-10 NOTE — PROGRESS NOTES
Hematology/Oncology Outpatient Follow Up    Alexx JOSE ARMANDO Workman  1970    Primary Care Physician: Elaine Garcia MD  Referring Physician: Elaine Garcia MD  Chief Complaint:  History of cancers  History of Present Illness:     · Mr. Workman is in my office for possible screening tests to avoid cancers.  · His mother  from breast cancer at age 72.  · His father had multiple myeloma but passed away from other conditions.  · There is breast cancer history on his maternal side  · Patient's mother had genetic testing for BRCA1 and BRCA2 mutation there was a variant of unknown significance but negative for deleterious mutations.    Past Medical History:   Diagnosis Date   • Hyperlipidemia    • LEANNA (obstructive sleep apnea)    • Type 2 diabetes mellitus (CMS/HCC)        Past Surgical History:   Procedure Laterality Date   • NOSE SURGERY      laser nose   • TOE NAIL AMPUTATION     • TONSILLECTOMY           Current Outpatient Medications:   •  atorvastatin (LIPITOR) 10 MG tablet, Take one daily (KEEP SCHEDULED APPOINTMENT FOR FURTHER REFILLS), Disp: 90 tablet, Rfl: 0  •  Cholecalciferol (VITAMIN D3) 125 MCG (5000 UT) capsule capsule, VITAMIN D3 5000 UNIT CAPS, Disp: , Rfl:   •  Cyanocobalamin (VITAMIN B-12) 1000 MCG sublingual tablet, PLACE ONE TABLET UNDER THE TONGUE DAILY, Disp: 90 each, Rfl: 2  •  cyclobenzaprine (FLEXERIL) 10 MG tablet, Take 1 tablet by mouth every night at bedtime., Disp: 30 tablet, Rfl: 1  •  Empagliflozin (JARDIANCE) 10 MG tablet, Take 10 mg by mouth Daily., Disp: 30 tablet, Rfl: 4  •  glucose blood (ONE TOUCH ULTRA TEST) test strip, ONETOUCH ULTRA BLUE STRP, Disp: , Rfl:   •  JANUMET XR  MG tablet, TAKE ONE TABLET BY MOUTH TWICE A DAY, Disp: 60 tablet, Rfl: 4    No Known Allergies    Family History   Problem Relation Age of Onset   • Lupus Mother    • Hypertension Mother    • Cancer Mother         lung / breast / skin   • Diabetes Father    • Cancer Father         myeloma    •  "Diabetes Brother        Cancer-related family history includes Cancer in his father and mother.    Social History     Tobacco Use   • Smoking status: Never Smoker   • Smokeless tobacco: Never Used   Substance Use Topics   • Alcohol use: No     Frequency: Never   • Drug use: Never       I have reviewed the history of present illness, past medical history, family history, social history, lab results, all notes and other records     SUBJECTIVE:      Patient is my office for initial visit about questions regarding a test that can prevent him from cancers    ROS:      Review of Systems   Constitutional: Negative for fever.   HENT: Negative for nosebleeds and trouble swallowing.    Eyes: Negative for visual disturbance.   Respiratory: Negative for cough, shortness of breath and wheezing.    Cardiovascular: Negative for chest pain.   Gastrointestinal: Negative for abdominal pain and blood in stool.   Endocrine: Negative for cold intolerance.   Genitourinary: Negative for dysuria and hematuria.   Musculoskeletal: Negative for joint swelling.   Skin: Negative for rash.   Allergic/Immunologic: Negative for environmental allergies.   Neurological: Negative for seizures.   Hematological: Does not bruise/bleed easily.   Psychiatric/Behavioral: The patient is not nervous/anxious.          Objective:       Vitals:    01/10/20 1103   BP: 118/87   Pulse: 88   Resp: 20   Temp: 97.9 °F (36.6 °C)   Weight: 88.8 kg (195 lb 12.8 oz)   Height: 177.8 cm (70\")         PHYSICAL EXAM:      Physical Exam   Constitutional: He is oriented to person, place, and time. No distress.   HENT:   Head: Normocephalic and atraumatic.   Eyes: Conjunctivae and EOM are normal. Right eye exhibits no discharge. Left eye exhibits no discharge. No scleral icterus.   Neck: Normal range of motion. Neck supple. No thyromegaly present.   Cardiovascular: Normal rate, regular rhythm and normal heart sounds. Exam reveals no gallop and no friction rub. "   Pulmonary/Chest: Effort normal. No stridor. No respiratory distress. He has no wheezes.   Abdominal: Soft. Bowel sounds are normal. He exhibits no mass. There is no tenderness. There is no rebound and no guarding.   Musculoskeletal: Normal range of motion. He exhibits no tenderness.   Lymphadenopathy:     He has no cervical adenopathy.   Neurological: He is alert and oriented to person, place, and time. He exhibits normal muscle tone.   Skin: Skin is warm. No rash noted. He is not diaphoretic. No erythema.   Psychiatric: He has a normal mood and affect. His behavior is normal.   Nursing note and vitals reviewed.       RECENT LABS:     WBC   Date Value Ref Range Status   01/10/2020 6.80 3.40 - 10.80 10*3/mm3 Final     RBC   Date Value Ref Range Status   01/10/2020 5.09 4.14 - 5.80 10*6/mm3 Final     Hemoglobin   Date Value Ref Range Status   01/10/2020 16.0 13.0 - 17.7 g/dL Final     Hematocrit   Date Value Ref Range Status   01/10/2020 45.9 37.5 - 51.0 % Final     MCV   Date Value Ref Range Status   01/10/2020 90.2 79.0 - 97.0 fL Final     MCH   Date Value Ref Range Status   01/10/2020 31.4 26.6 - 33.0 pg Final     MCHC   Date Value Ref Range Status   01/10/2020 34.9 31.5 - 35.7 g/dL Final     RDW   Date Value Ref Range Status   01/10/2020 12.7 12.3 - 15.4 % Final     RDW-SD   Date Value Ref Range Status   01/10/2020 40.9 37.0 - 54.0 fl Final     MPV   Date Value Ref Range Status   01/10/2020 9.6 6.0 - 12.0 fL Final     Platelets   Date Value Ref Range Status   01/10/2020 198 140 - 450 10*3/mm3 Final     Neutrophil %   Date Value Ref Range Status   01/10/2020 63.8 42.7 - 76.0 % Final     Lymphocyte %   Date Value Ref Range Status   01/10/2020 26.2 19.6 - 45.3 % Final     Monocyte %   Date Value Ref Range Status   01/10/2020 6.8 5.0 - 12.0 % Final     Eosinophil %   Date Value Ref Range Status   01/10/2020 2.9 0.3 - 6.2 % Final     Basophil %   Date Value Ref Range Status   01/10/2020 0.3 0.0 - 1.5 % Final      Neutrophils, Absolute   Date Value Ref Range Status   01/10/2020 4.34 1.70 - 7.00 10*3/mm3 Final     Lymphocytes, Absolute   Date Value Ref Range Status   01/10/2020 1.78 0.70 - 3.10 10*3/mm3 Final     Monocytes, Absolute   Date Value Ref Range Status   01/10/2020 0.46 0.10 - 0.90 10*3/mm3 Final     Eosinophils, Absolute   Date Value Ref Range Status   01/10/2020 0.20 0.00 - 0.40 10*3/mm3 Final     Basophils, Absolute   Date Value Ref Range Status   01/10/2020 0.02 0.00 - 0.20 10*3/mm3 Final       Lab Results   Component Value Date    GLUCOSE 171 (H) 11/26/2019    BUN 17 11/26/2019    CREATININE 1.12 11/26/2019    EGFRIFNONA 69 11/26/2019    BCR 15.2 11/26/2019    K 4.6 11/26/2019    CO2 27.9 11/26/2019    CALCIUM 9.2 11/26/2019    ALBUMIN 4.70 11/26/2019    LABIL2 1.6 03/01/2019    AST 22 11/26/2019    ALT 41 11/26/2019         Assessment/Plan      ASSESSMENT:     1. Family history of breast cancer and multiple myeloma    PLAN:      1. Patient is my mother passed away from metastatic breast cancer in 2019 and was a patient at the cancer [facility].  With patient's permission we reviewed her records which showed that she had genetic testing done which was negative for deleterious mutations.  Patient's mother was 72 at that time  2. Explained in detail to the patient that since his mother did not not have any mutation but my risk panel, at this point will not have to check genetic testing for this patient.    3. Inform patient to have colonoscopy done per screening protocol and PSA per protocol to call.  4. I answered all his questions to his satisfaction  5. I will see him back in my office only as needed    I have reviewed labs results, imaging, vitals, and medications with the patient today.      Patient verbalized understanding and is in agreement of the above plan.          This report was compiled using Dragon voice recognition software. I have made every effort to proof read this document; however,  typographical errors may persist.

## 2020-01-13 ENCOUNTER — TREATMENT (OUTPATIENT)
Dept: PHYSICAL THERAPY | Facility: CLINIC | Age: 50
End: 2020-01-13

## 2020-01-13 ENCOUNTER — OFFICE VISIT (OUTPATIENT)
Dept: FAMILY MEDICINE CLINIC | Facility: CLINIC | Age: 50
End: 2020-01-13

## 2020-01-13 VITALS
HEART RATE: 76 BPM | WEIGHT: 194 LBS | RESPIRATION RATE: 8 BRPM | DIASTOLIC BLOOD PRESSURE: 90 MMHG | SYSTOLIC BLOOD PRESSURE: 140 MMHG | TEMPERATURE: 97.6 F | BODY MASS INDEX: 27.84 KG/M2

## 2020-01-13 DIAGNOSIS — M54.50 CHRONIC MIDLINE LOW BACK PAIN WITHOUT SCIATICA: Primary | ICD-10-CM

## 2020-01-13 DIAGNOSIS — B02.9 HERPES ZOSTER WITHOUT COMPLICATION: Primary | ICD-10-CM

## 2020-01-13 DIAGNOSIS — G89.29 CHRONIC MIDLINE LOW BACK PAIN WITHOUT SCIATICA: Primary | ICD-10-CM

## 2020-01-13 PROCEDURE — 99213 OFFICE O/P EST LOW 20 MIN: CPT | Performed by: NURSE PRACTITIONER

## 2020-01-13 PROCEDURE — 97014 ELECTRIC STIMULATION THERAPY: CPT | Performed by: PHYSICAL THERAPIST

## 2020-01-13 PROCEDURE — 97140 MANUAL THERAPY 1/> REGIONS: CPT | Performed by: PHYSICAL THERAPIST

## 2020-01-13 RX ORDER — METHYLPREDNISOLONE 4 MG/1
TABLET ORAL
Qty: 21 TABLET | Refills: 0 | Status: SHIPPED | OUTPATIENT
Start: 2020-01-13 | End: 2020-07-31

## 2020-01-13 RX ORDER — VALACYCLOVIR HYDROCHLORIDE 1 G/1
1000 TABLET, FILM COATED ORAL 3 TIMES DAILY
Qty: 21 TABLET | Refills: 0 | Status: SHIPPED | OUTPATIENT
Start: 2020-01-13 | End: 2020-01-20

## 2020-01-13 NOTE — PROGRESS NOTES
Physical Therapy Daily Progress Note    Patient: Alexx Workman   : 1970  Diagnosis/ICD-10 Code:  Chronic midline low back pain without sciatica [M54.5, G89.29]  Referring practitioner: Elaine Garcia MD  Date of Initial Visit: Type: THERAPY  Noted: 2020  Today's Date: 2020  Patient seen for 3 sessions         Alexx Workman reports:  Decreased pain in lower back, however mild discomfort still present.     Objective   See Exercise, Manual, and Modality Logs for complete treatment.       Assessment/Plan     Reports decreased pain after today's treatment.  Progressing well.     Progress per Plan of Care           Manual Therapy:    25     mins  50977;  Therapeutic Exercise:         mins  26054;     Neuromuscular Drew:        mins  30713;    Therapeutic Activity:          mins  08971;     Gait Training:           mins  56483;     Ultrasound:          mins  87468;    Electrical Stimulation:    15     mins  52717 ( );  Dry Needling          mins self-pay    Timed Treatment:   25   mins   Total Treatment:     40   mins    Ariel Teran PT  Physical Therapist

## 2020-01-13 NOTE — PROGRESS NOTES
Jhoan Workman is a 49 y.o. male.     Chief Complaint   Patient presents with   • Earache       /90 (BP Location: Left arm, Patient Position: Sitting, Cuff Size: Adult)   Pulse 76   Temp 97.6 °F (36.4 °C) (Oral)   Resp 8   Wt 88 kg (194 lb)   BMI 27.84 kg/m²     BP Readings from Last 3 Encounters:   01/13/20 140/90   01/10/20 118/87   12/18/19 148/92       Wt Readings from Last 3 Encounters:   01/13/20 88 kg (194 lb)   01/10/20 88.8 kg (195 lb 12.8 oz)   12/18/19 88.8 kg (195 lb 12.8 oz)       Pt comes in today with c/o tingling sensation and sensitivity to scalp on right side. Also with right ear pain. Symptoms started yesterday. No rash is present. Pt states his head is tender to touch on that side. No HA.  No recent illness.  No fever or chills.        The following portions of the patient's history were reviewed and updated as appropriate: allergies, current medications, past family history, past medical history, past social history, past surgical history and problem list.    Review of Systems   Constitutional: Negative for fatigue.   Skin: Negative for rash.   Neurological: Negative for facial asymmetry and headache.       Objective   Physical Exam   Neurological: A sensory deficit is present.   Tender and sensitive scalp.    Skin: Skin is warm and dry. No rash noted.         Diagnoses and all orders for this visit:    1. Herpes zoster without complication (Primary)  -     valACYclovir (VALTREX) 1000 MG tablet; Take 1 tablet by mouth 3 (Three) Times a Day for 7 days.  Dispense: 21 tablet; Refill: 0  -     methylPREDNISolone (MEDROL, ANGELA,) 4 MG tablet; Take as directed on package instructions.  Dispense: 21 tablet; Refill: 0    based on symptoms, will treat for early signs of shingles. Pt will follow up as needed  During this office visit, we discussed the pertinent aspects of the visit and treatment recommendations. Pt verbalizes understanding. Follow up was discussed. Patient was given  the opportunity to ask questions and discuss other concerns.       Return if symptoms worsen or fail to improve.

## 2020-01-15 ENCOUNTER — TREATMENT (OUTPATIENT)
Dept: PHYSICAL THERAPY | Facility: CLINIC | Age: 50
End: 2020-01-15

## 2020-01-15 DIAGNOSIS — G89.29 CHRONIC MIDLINE LOW BACK PAIN WITHOUT SCIATICA: Primary | ICD-10-CM

## 2020-01-15 DIAGNOSIS — M54.50 CHRONIC MIDLINE LOW BACK PAIN WITHOUT SCIATICA: Primary | ICD-10-CM

## 2020-01-15 PROCEDURE — 97014 ELECTRIC STIMULATION THERAPY: CPT | Performed by: PHYSICAL THERAPIST

## 2020-01-15 PROCEDURE — 97140 MANUAL THERAPY 1/> REGIONS: CPT | Performed by: PHYSICAL THERAPIST

## 2020-01-15 PROCEDURE — 97110 THERAPEUTIC EXERCISES: CPT | Performed by: PHYSICAL THERAPIST

## 2020-01-15 NOTE — PROGRESS NOTES
"   Physical Therapy Daily Progress Note    Patient: Alexx Workman   : 1970  Diagnosis/ICD-10 Code:  Chronic midline low back pain without sciatica [M54.5, G89.29]  Referring practitioner: Elaine Garcia MD  Date of Initial Visit: Type: THERAPY  Noted: 2020  Today's Date: 1/15/2020  Patient seen for 4 sessions         Alexx Workman reports:  No pain with rotation, however reports \"tightness and discomfort\" across lower back.     Objective   See Exercise, Manual, and Modality Logs for complete treatment.       Assessment/Plan    Tolerates manual therapy well.  Requires multiple verbal and tactile cues for PPT, however observed proper technique by end of treatment.  Reports feeling well after electrical stimulation.  Progressing well.   Progress per Plan of Care           Manual Therapy:    15     mins  27243;  Therapeutic Exercise:    10     mins  27691;     Neuromuscular Drew:        mins  54472;    Therapeutic Activity:          mins  37501;     Gait Training:           mins  24185;     Ultrasound:          mins  01734;    Electrical Stimulation:    15     mins  69930 ( );  Dry Needling          mins self-pay    Timed Treatment:   25   mins   Total Treatment:     40   mins    Ariel Teran PT  Physical Therapist                      "

## 2020-01-17 ENCOUNTER — TREATMENT (OUTPATIENT)
Dept: PHYSICAL THERAPY | Facility: CLINIC | Age: 50
End: 2020-01-17

## 2020-01-17 DIAGNOSIS — G89.29 CHRONIC MIDLINE LOW BACK PAIN WITHOUT SCIATICA: Primary | ICD-10-CM

## 2020-01-17 DIAGNOSIS — M54.50 CHRONIC MIDLINE LOW BACK PAIN WITHOUT SCIATICA: Primary | ICD-10-CM

## 2020-01-17 PROCEDURE — 97140 MANUAL THERAPY 1/> REGIONS: CPT | Performed by: PHYSICAL THERAPIST

## 2020-01-17 PROCEDURE — 97014 ELECTRIC STIMULATION THERAPY: CPT | Performed by: PHYSICAL THERAPIST

## 2020-01-17 NOTE — PROGRESS NOTES
"   Physical Therapy Daily Progress Note    Patient: Alexx Workman   : 1970  Diagnosis/ICD-10 Code:  Chronic midline low back pain without sciatica [M54.5, G89.29]  Referring practitioner: Elaine Garcia MD  Date of Initial Visit: Type: THERAPY  Noted: 2020  Today's Date: 2020  Patient seen for 5 sessions         Alexx Workman reports:  Lower back feeling better, however still feels a \"pinch\" when reaching down and to the right.   Subjective Questionnaire:  Oswestry: 10%      Objective   See Exercise, Manual, and Modality Logs for complete treatment.       Assessment/Plan     Tolerates manual therapy and modalities well this visit.  Progressing well.     Progress per Plan of Care           Manual Therapy:    15     mins  69135;  Therapeutic Exercise:         mins  58576;     Neuromuscular Drew:        mins  62773;    Therapeutic Activity:          mins  74655;     Gait Training:          mins  56741;     Ultrasound:          mins  00582;    Electrical Stimulation:    15     mins  88748 ( );  Dry Needling          mins self-pay    Timed Treatment:   15   mins   Total Treatment:     30   mins    Ariel Teran PT  Physical Therapist                      "

## 2020-01-20 ENCOUNTER — TREATMENT (OUTPATIENT)
Dept: PHYSICAL THERAPY | Facility: CLINIC | Age: 50
End: 2020-01-20

## 2020-01-20 DIAGNOSIS — G89.29 CHRONIC MIDLINE LOW BACK PAIN WITHOUT SCIATICA: Primary | ICD-10-CM

## 2020-01-20 DIAGNOSIS — M54.50 CHRONIC MIDLINE LOW BACK PAIN WITHOUT SCIATICA: Primary | ICD-10-CM

## 2020-01-20 PROCEDURE — 97014 ELECTRIC STIMULATION THERAPY: CPT | Performed by: PHYSICAL THERAPIST

## 2020-01-20 PROCEDURE — 97140 MANUAL THERAPY 1/> REGIONS: CPT | Performed by: PHYSICAL THERAPIST

## 2020-01-20 NOTE — PROGRESS NOTES
Physical Therapy Daily Progress Note    Patient: Alexx Workman   : 1970  Diagnosis/ICD-10 Code:  Chronic midline low back pain without sciatica [M54.5, G89.29]  Referring practitioner: Elaine Garcia MD  Date of Initial Visit: Type: THERAPY  Noted: 2020  Today's Date: 2020  Patient seen for 6 sessions         Alexx Workman reports:   Improvement since beginning PT, however still having pain with bending backwards and to the right.     Objective   See Exercise, Manual, and Modality Logs for complete treatment.       Assessment/Plan    Tolerates manual therapy and modalities well.  Hypomobility noted at L5 with CPA and with UPA at right/superior sacrum.     Progress per Plan of Care           Manual Therapy:    15     mins  09003;  Therapeutic Exercise:         mins  94603;     Neuromuscular Drew:        mins  79093;    Therapeutic Activity:         mins  89389;     Gait Training:           mins  40896;     Ultrasound:          mins  20191;    Electrical Stimulation:    15     mins  14519 ( );  Dry Needling          mins self-pay    Timed Treatment:   15   mins   Total Treatment:     30   mins    Ariel Teran PT  Physical Therapist

## 2020-01-22 ENCOUNTER — TREATMENT (OUTPATIENT)
Dept: PHYSICAL THERAPY | Facility: CLINIC | Age: 50
End: 2020-01-22

## 2020-01-22 DIAGNOSIS — M54.50 CHRONIC MIDLINE LOW BACK PAIN WITHOUT SCIATICA: Primary | ICD-10-CM

## 2020-01-22 DIAGNOSIS — G89.29 CHRONIC MIDLINE LOW BACK PAIN WITHOUT SCIATICA: Primary | ICD-10-CM

## 2020-01-22 PROCEDURE — 97014 ELECTRIC STIMULATION THERAPY: CPT | Performed by: PHYSICAL THERAPIST

## 2020-01-22 PROCEDURE — 97140 MANUAL THERAPY 1/> REGIONS: CPT | Performed by: PHYSICAL THERAPIST

## 2020-01-22 NOTE — PROGRESS NOTES
Physical Therapy Daily Progress Note    Patient: Alexx Workman   : 1970  Diagnosis/ICD-10 Code:  Chronic midline low back pain without sciatica [M54.5, G89.29]  Referring practitioner: Elaine Garcia MD  Date of Initial Visit: Type: THERAPY  Noted: 2020  Today's Date: 2020  Patient seen for 7 sessions         Alexx Workman reports: Still having discomfort with rotation through lower back/hips.  Pain located in lower right side of low back.     Objective   See Exercise, Manual, and Modality Logs for complete treatment.       Assessment/Plan     Reports feeling better after manual therapy.  Patient reporting gradual decrease in progression and compliant with HEP.      Progress per Plan of Care           Manual Therapy:    15     mins  31020;  Therapeutic Exercise:         mins  37574;     Neuromuscular Drew:        mins  12704;    Therapeutic Activity:          mins  15896;     Gait Training:           mins  13554;     Ultrasound:          mins  73105;    Electrical Stimulation:    15     mins  05869 ( );  Dry Needling          mins self-pay    Timed Treatment:   15   mins   Total Treatment:     30   mins    Ariel Teran PT  Physical Therapist

## 2020-01-23 ENCOUNTER — TELEPHONE (OUTPATIENT)
Dept: ENDOCRINOLOGY | Facility: CLINIC | Age: 50
End: 2020-01-23

## 2020-01-23 NOTE — TELEPHONE ENCOUNTER
Patient saw Jaelyn Barrios. Patient just came off a steroid pack (Saturday) and is expected to do labs in April. Should he push this all back a month or keep them as is? Patient started Jardiance at last visit.  Please advise.

## 2020-01-24 ENCOUNTER — TREATMENT (OUTPATIENT)
Dept: PHYSICAL THERAPY | Facility: CLINIC | Age: 50
End: 2020-01-24

## 2020-01-24 DIAGNOSIS — G89.29 CHRONIC MIDLINE LOW BACK PAIN WITHOUT SCIATICA: Primary | ICD-10-CM

## 2020-01-24 DIAGNOSIS — M54.50 CHRONIC MIDLINE LOW BACK PAIN WITHOUT SCIATICA: Primary | ICD-10-CM

## 2020-01-24 PROCEDURE — 97140 MANUAL THERAPY 1/> REGIONS: CPT | Performed by: PHYSICAL THERAPIST

## 2020-01-24 PROCEDURE — 97014 ELECTRIC STIMULATION THERAPY: CPT | Performed by: PHYSICAL THERAPIST

## 2020-01-24 NOTE — PROGRESS NOTES
Physical Therapy Daily Progress Note    Patient: Alexx Workman   : 1970  Diagnosis/ICD-10 Code:  Chronic midline low back pain without sciatica [M54.5, G89.29]  Referring practitioner: Elaine Garcia MD  Date of Initial Visit: Type: THERAPY  Noted: 2020  Today's Date: 2020  Patient seen for 8 sessions         Alexx Workman reports:   No pain with rotation over the past few days.  Reports compliance with HEP.    Objective   See Exercise, Manual, and Modality Logs for complete treatment.       Assessment/Plan     Patient tolerates manual therapy well this visit.  Reports feeling well at end of treatment.  Patient to continue with HEP and return for treatment if symptoms return.     Progress per Plan of Care           Manual Therapy:    15     mins  14097;  Therapeutic Exercise:     5    mins  16366;     Neuromuscular Drew:        mins  16910;    Therapeutic Activity:          mins  46690;     Gait Training:           mins  83287;     Ultrasound:          mins  54725;    Electrical Stimulation:    15     mins  35404 ( );  Dry Needling          mins self-pay    Timed Treatment:   20   mins   Total Treatment:     35   mins    Ariel Teran PT  Physical Therapist

## 2020-01-29 RX ORDER — ATORVASTATIN CALCIUM 10 MG/1
TABLET, FILM COATED ORAL
Qty: 90 TABLET | Refills: 2 | Status: SHIPPED | OUTPATIENT
Start: 2020-01-29 | End: 2020-10-27

## 2020-05-20 RX ORDER — EMPAGLIFLOZIN 10 MG/1
TABLET, FILM COATED ORAL
Qty: 30 TABLET | Refills: 3 | Status: SHIPPED | OUTPATIENT
Start: 2020-05-20 | End: 2020-09-23

## 2020-06-01 RX ORDER — SITAGLIPTIN AND METFORMIN HYDROCHLORIDE 1000; 50 MG/1; MG/1
TABLET, FILM COATED, EXTENDED RELEASE ORAL
Qty: 60 TABLET | Refills: 3 | Status: SHIPPED | OUTPATIENT
Start: 2020-06-01 | End: 2020-09-23

## 2020-06-16 RX ORDER — MAGNESIUM 200 MG
TABLET ORAL
Qty: 90 EACH | Refills: 2 | Status: SHIPPED | OUTPATIENT
Start: 2020-06-16 | End: 2021-03-19

## 2020-06-22 ENCOUNTER — OFFICE VISIT (OUTPATIENT)
Dept: FAMILY MEDICINE CLINIC | Facility: CLINIC | Age: 50
End: 2020-06-22

## 2020-06-22 VITALS
HEIGHT: 70 IN | TEMPERATURE: 98 F | SYSTOLIC BLOOD PRESSURE: 142 MMHG | RESPIRATION RATE: 16 BRPM | HEART RATE: 70 BPM | DIASTOLIC BLOOD PRESSURE: 90 MMHG | WEIGHT: 189 LBS | BODY MASS INDEX: 27.06 KG/M2

## 2020-06-22 DIAGNOSIS — M67.922 TENDINOPATHY OF LEFT BICEPS TENDON: Primary | ICD-10-CM

## 2020-06-22 PROCEDURE — 99214 OFFICE O/P EST MOD 30 MIN: CPT | Performed by: PHYSICIAN ASSISTANT

## 2020-06-22 RX ORDER — PREDNISONE 20 MG/1
20 TABLET ORAL 2 TIMES DAILY
Qty: 10 TABLET | Refills: 0 | Status: SHIPPED | OUTPATIENT
Start: 2020-06-22 | End: 2020-07-31

## 2020-06-22 NOTE — PROGRESS NOTES
"Subjective   Alexx JOSE ARMANDO Workman is a 50 y.o. male.     Chief Complaint   Patient presents with   • Arm Pain     Left arm, states it started a few months ago.        /90 (BP Location: Left arm, Patient Position: Sitting, Cuff Size: Adult)   Pulse 70   Temp 98 °F (36.7 °C) (Temporal)   Resp 16   Ht 177.8 cm (70\")   Wt 85.7 kg (189 lb)   BMI 27.12 kg/m²     BP Readings from Last 3 Encounters:   06/22/20 142/90   01/13/20 140/90   01/10/20 118/87       Wt Readings from Last 3 Encounters:   06/22/20 85.7 kg (189 lb)   01/13/20 88 kg (194 lb)   01/10/20 88.8 kg (195 lb 12.8 oz)       HPI patient presents to the clinic with complaints of pain in the left arm that is been present over the past 2 months.  Patient states that the pain is worse when he moves his arm and specific directions.  He denies neck pain or shoulder pain.  He denies any inciting injury or overuse to the arm.  He states that he does carry a heavy briefcase on the left shoulder.  He states that the pain often times will radiate down to his wrist.  He denies ever having any issues with the shoulder in the past.  Of note he did just finish seeing physical therapy secondary to lower back pain.    The following portions of the patient's history were reviewed and updated as appropriate: allergies, current medications, past family history, past medical history, past social history, past surgical history and problem list.    Review of Systems   Constitutional: Negative for activity change, appetite change and fatigue.   HENT: Negative for congestion, rhinorrhea and sore throat.    Eyes: Negative for blurred vision, pain and visual disturbance.   Respiratory: Negative for cough, chest tightness and shortness of breath.    Cardiovascular: Negative for chest pain and leg swelling.   Gastrointestinal: Negative for abdominal pain, blood in stool and nausea.   Endocrine: Negative for polydipsia and polyuria.   Genitourinary: Negative for dysuria and urgency. "   Musculoskeletal: Positive for arthralgias (Left upper arm pain). Negative for back pain.   Skin: Negative for rash and bruise.   Allergic/Immunologic: Negative for environmental allergies.   Neurological: Negative for headache and confusion.   Hematological: Negative for adenopathy. Does not bruise/bleed easily.   Psychiatric/Behavioral: Negative for stress. The patient is not nervous/anxious.        Objective   Physical Exam   Constitutional: He is oriented to person, place, and time. He appears well-developed and well-nourished.   HENT:   Head: Normocephalic and atraumatic.   Eyes: Pupils are equal, round, and reactive to light. EOM are normal.   Neck: Normal range of motion. Neck supple.   Cardiovascular: Normal rate and regular rhythm.   Pulmonary/Chest: Effort normal.   Musculoskeletal: Normal range of motion. He exhibits tenderness. He exhibits no edema or deformity.   There is tenderness over the short head and long head of the biceps tendon.  There is pain with internal rotation, pronation, and supination of the left arm.  There is no weakness or pain with abduction or adduction.   Neurological: He is alert and oriented to person, place, and time.   Psychiatric: He has a normal mood and affect. His behavior is normal.         Diagnoses and all orders for this visit:    1. Tendinopathy of left biceps tendon (Primary)  -     Ambulatory Referral to Physical Therapy Evaluate and treat (left bicep tendinitis)  Follow-up in 6 weeks if left arm pain is not better with physical therapy and anti-inflammatory treatment.  Other orders  -     predniSONE (DELTASONE) 20 MG tablet; Take 1 tablet by mouth 2 (Two) Times a Day.  Dispense: 10 tablet; Refill: 0        Return in about 6 weeks (around 8/3/2020), or if symptoms worsen or fail to improve.

## 2020-07-02 ENCOUNTER — TREATMENT (OUTPATIENT)
Dept: PHYSICAL THERAPY | Facility: CLINIC | Age: 50
End: 2020-07-02

## 2020-07-02 DIAGNOSIS — M67.922 TENDINOPATHY OF LEFT BICEPS TENDON: Primary | ICD-10-CM

## 2020-07-02 PROCEDURE — 97035 APP MDLTY 1+ULTRASOUND EA 15: CPT | Performed by: PHYSICAL THERAPIST

## 2020-07-02 PROCEDURE — 97161 PT EVAL LOW COMPLEX 20 MIN: CPT | Performed by: PHYSICAL THERAPIST

## 2020-07-02 PROCEDURE — 97110 THERAPEUTIC EXERCISES: CPT | Performed by: PHYSICAL THERAPIST

## 2020-07-02 NOTE — PROGRESS NOTES
Physical Therapy Initial Evaluation and Plan of Care    Patient: Alexx Workman   : 1970  Diagnosis/ICD-10 Code:  Tendinopathy of left biceps tendon [M67.922]  Referring practitioner: David Lazo PA-C    Subjective Evaluation    History of Present Illness  Mechanism of injury: Pt is a 50 year old male who reports insidious onset of L arm pain that starts in deltoid mm and radiates into elbow and forearm beginning 4 months ago.       Patient Occupation:   Quality of life: good    Pain  Current pain ratin  At worst pain ratin  Location: L lateral arm into L elbow and forearm   Quality: sharp, radiating and dull ache  Relieving factors: rest  Aggravating factors: repetitive movement, outstretched reach and sleeping  Progression: worsening    Social Support  Lives in: multiple-level home  Lives with: spouse    Hand dominance: right    Diagnostic Tests  No diagnostic tests performed    Treatments  No previous or current treatments  Patient Goals  Patient goals for therapy: decreased pain, increased strength and independence with ADLs/IADLs  Patient goal: playing guitar            Objective          Static Posture     Head  Forward.    Shoulders  Rounded.    Tenderness     Right Shoulder  Tenderness in the biceps tendon (proximal) and supraspinatus tendon.     Active Range of Motion   Cervical/Thoracic Spine   Normal active range of motion  Left Shoulder   Flexion: 140 degrees with pain  Abduction: 90 degrees with pain  External rotation 0°: WFL    Right Shoulder   Normal active range of motion    Additional Active Range of Motion Details  Unable to perform behind back IR due to pain    Passive Range of Motion   Left Shoulder   Normal passive range of motion    Strength/Myotome Testing     Right Shoulder   Normal muscle strength    Left Elbow   Flexion: 5  Extension: 5    Right Elbow   Flexion: 5  Extension: 5    Left Wrist/Hand   Wrist extension: 5  Wrist flexion: 5  Radial deviation: 5  Ulnar  deviation: 5    Right Wrist/Hand   Wrist extension: 5  Wrist flexion: 5  Radial deviation: 5  Ulnar deviation: 5    Additional Strength Details  Unable to formally assess shoulder and scapular strength due to pain inhibition.   Unable to formally test due to immediate pain in testing position. Inhibited by pain.     Tests   Cervical     Left   Negative active compression (Edmonds), cervical distraction, Spurling's sign and ULTT1.     Right   Negative active compression (Edmonds), cervical distraction, Spurling's sign and ULTT1.     Left Shoulder   Positive empty can and painful arc.   Negative Hawkin's and Neer's.   Left Shoulder Flexibility Comments:   Mild tightness in upper trap, pec and levator mm  Right Shoulder Flexibility Comments:   Mild tightness in upper trap, pec and levator mm          Assessment & Plan     Assessment  Impairments: abnormal or restricted ROM, impaired physical strength, lacks appropriate home exercise program and pain with function  Assessment details: Pt is a 50 year old male who reports 4 month h/o L shoulder/arm pain with insidious onset. He reports pain with overhead mobility, reaching to the side, and reaching behind his back. Pt presents to skilled PT with painful and limited AROM, pain inhibition upon strength testing, TTP over biceps and supraspinatus tendons, painful arc and empty can tests. PROM is WFL, negative cervical spine tests, and WFL CAROM.  This affects ability to perform ADLs, household tasks, and overhead mobility . Pt would benefit from skilled PT to address the above findings and allow pt to return to PLOF.    Barriers to therapy: None  Prognosis: good  Functional Limitations: sleeping, reaching behind back, reaching overhead and unable to perform repetitive tasks  Goals  Plan Goals: ST.Pt will report reduction in worst pain level to <5/10 in 3 weeks for improved ease with overhead mobility and ADLs.  2.Pt will improve AROM of L shoulder abd to 120 deg  in 3  weeks for improved ease with ADLs and overhead tasks.  3. Pt will demo improved postural awareness with minimal cues for decreased stress to GH joint and spinal segments.     LT.Pt will be independent with HEP in 6 weeks for self management of symptoms and decreased risk for re-occurrence.   2.Pt will improve strength of L shoulder and scapular mm to grossly 4+/5 in 6 weeks for improved ease with overhead tasks.  3.Pt will improve AROM of L shoulder flex and ABD to 165 degrees in 6 weeks for improved ease with overhead tasks and ADLs.  4.Pt will report reduction in worst pain level to <3/10 in 6 weeks for improved quality of life and return to PLOF.       Plan  Therapy options: will be seen for skilled physical therapy services  Planned modality interventions: cryotherapy, electrical stimulation/Russian stimulation, thermotherapy (hydrocollator packs) and ultrasound  Planned therapy interventions: flexibility, functional ROM exercises, home exercise program, joint mobilization, manual therapy, postural training, spinal/joint mobilization, strengthening and stretching  Frequency: 1x week  Duration in visits: 6  Treatment plan discussed with: patient          Timed:           Therapeutic Exercise:    15     mins  12337;      Ultrasound:     8     mins  91335;        Un-Timed:  Low Eval     30     Mins  62525        Timed Treatment:   23   mins   Total Treatment:     53   mins    PT SIGNATURE: Valentina Shore, PT       DATE TREATMENT INITIATED: 2020    Initial Certification  Certification Period: 2020  I certify that the therapy services are furnished while this patient is under my care.  The services outlined above are required by this patient, and will be reviewed every 90 days.     PHYSICIAN: David Lazo PA-C      DATE:     Please sign and return via fax to [phone].. Thank you, [facility].

## 2020-07-08 ENCOUNTER — TREATMENT (OUTPATIENT)
Dept: PHYSICAL THERAPY | Facility: CLINIC | Age: 50
End: 2020-07-08

## 2020-07-08 DIAGNOSIS — M67.922 TENDINOPATHY OF LEFT BICEPS TENDON: Primary | ICD-10-CM

## 2020-07-08 PROCEDURE — 97035 APP MDLTY 1+ULTRASOUND EA 15: CPT | Performed by: PHYSICAL THERAPIST

## 2020-07-08 PROCEDURE — 97140 MANUAL THERAPY 1/> REGIONS: CPT | Performed by: PHYSICAL THERAPIST

## 2020-07-08 PROCEDURE — 97110 THERAPEUTIC EXERCISES: CPT | Performed by: PHYSICAL THERAPIST

## 2020-07-15 ENCOUNTER — TREATMENT (OUTPATIENT)
Dept: PHYSICAL THERAPY | Facility: CLINIC | Age: 50
End: 2020-07-15

## 2020-07-15 DIAGNOSIS — M67.922 TENDINOPATHY OF LEFT BICEPS TENDON: Primary | ICD-10-CM

## 2020-07-15 PROCEDURE — 97014 ELECTRIC STIMULATION THERAPY: CPT | Performed by: PHYSICAL THERAPIST

## 2020-07-15 PROCEDURE — 97140 MANUAL THERAPY 1/> REGIONS: CPT | Performed by: PHYSICAL THERAPIST

## 2020-07-15 NOTE — PROGRESS NOTES
Physical Therapy Daily Progress Note  Visit: 3    Alexx Workman reports: increase in pain since last visit. States his pain has been more consistent and sore and has had difficulty sleeping due to it.     Subjective       Objective   See Exercise, Manual, and Modality Logs for complete treatment.       Assessment & Plan     Assessment  Assessment details: Pt with reports of increase in pain since last visit. Held strengthening therex and focused on manual intervention to address spinal and GH joint hypomobility. Performed manual traction and passive cervical spine stretches as well. Planning to assess response at next visit and if pt continues without progression, will return to MD.          Timed:         Manual Therapy:    28     mins  18223;     Therapeutic Exercise:    5     mins  28603;       Un-Timed:  Electrical Stimulation:    15     mins  77618 ( );      Timed Treatment:   33   mins   Total Treatment:     53   mins  Valentina Shore PT  Physical Therapist

## 2020-07-22 ENCOUNTER — TREATMENT (OUTPATIENT)
Dept: PHYSICAL THERAPY | Facility: CLINIC | Age: 50
End: 2020-07-22

## 2020-07-22 DIAGNOSIS — M67.922 TENDINOPATHY OF LEFT BICEPS TENDON: Primary | ICD-10-CM

## 2020-07-22 PROCEDURE — 97012 MECHANICAL TRACTION THERAPY: CPT | Performed by: PHYSICAL THERAPIST

## 2020-07-22 PROCEDURE — 97140 MANUAL THERAPY 1/> REGIONS: CPT | Performed by: PHYSICAL THERAPIST

## 2020-07-22 PROCEDURE — 97014 ELECTRIC STIMULATION THERAPY: CPT | Performed by: PHYSICAL THERAPIST

## 2020-07-22 PROCEDURE — 97110 THERAPEUTIC EXERCISES: CPT | Performed by: PHYSICAL THERAPIST

## 2020-07-22 NOTE — PROGRESS NOTES
Re-Assessment / Re-Certification      Patient: Alexx Workman   : 1970  Diagnosis/ICD-10 Code:  Tendinopathy of left biceps tendon [M67.922]  Referring practitioner: David Lazo PA-C  Date of Initial Visit: Type: THERAPY  Noted: 2020  Today's Date: 2020  Patient seen for 4 sessions      Subjective:   Alexx Workman reports: Minimal change in symptoms thus far. Pain intensity remains severe when the pain occurs and reports pain always occurs with outreaching his arm and reaching to the side.   Subjective Questionnaire: 50% disability (45.45% at IE)  Clinical Progress: unchanged  Home Program Compliance: Yes  Treatment has included: therapeutic exercise, manual therapy, electrical stimulation, ultrasound, traction and moist heat    Subjective Evaluation    Pain  Current pain ratin  At worst pain ratin         Objective          Active Range of Motion   Cervical/Thoracic Spine   Normal active range of motion  Left Shoulder   Flexion: 140 degrees with pain  Abduction: 90 degrees with pain  External rotation 0°: WFL    Right Shoulder   Normal active range of motion    Additional Active Range of Motion Details  Unable to perform behind back IR due to pain    Passive Range of Motion   Left Shoulder   Normal passive range of motion    Strength/Myotome Testing     Left Shoulder     Planes of Motion   Flexion: 4+   Abduction: 4   External rotation at 0°: 4+   Internal rotation at 0°: 5     Right Shoulder   Normal muscle strength    Planes of Motion   Flexion: 4+   Abduction: 4+   External rotation at 0°: 4+   Internal rotation at 0°: 5     Left Elbow   Flexion: 5  Extension: 5    Right Elbow   Flexion: 5  Extension: 5    Left Wrist/Hand   Wrist extension: 5  Wrist flexion: 5  Radial deviation: 5  Ulnar deviation: 5    Right Wrist/Hand   Wrist extension: 5  Wrist flexion: 5  Radial deviation: 5  Ulnar deviation: 5    Additional Strength Details  Unable to formally assess shoulder and scapular strength  due to pain inhibition.   Left Shoulder Flexibility Comments:   Mild tightness in upper trap, pec and levator mm  Right Shoulder Flexibility Comments:   Mild tightness in upper trap, pec and levator mm      Assessment & Plan     Assessment  Assessment details: Pt presents with reports of minimal progress to date. Symptom intensity and frequency remain unchanged and pt reports continue inability to reach outwards due to immediate sharp pain in L upper arm. Pt reports symptoms usually go to the elbow, but have occasionly gone into L forearm. Trial of mechanical traction performed today.  Other treatments have included spinal and GH joint mobilization, soft tissue mobilization, stretching, RC and scapular strengthening, postural retraining, ultrasound, and electrical stimulation. Pt with hypomobility noted throughout upper and mid thoracic spine as well as tightness in pec, upper trap and levator mm. Pt has made improvement with UE and scapular strength, however AROM of L shoulder remains limited by pain and painful arc around 90 deg ABD. Due to minimal change in symptoms at this time, recommend follow up MD for further evaluation.     Goals  Plan Goals: ST.Pt will report reduction in worst pain level to <5/10 in 3 weeks for improved ease with overhead mobility and ADLs. NOT MET  2.Pt will improve AROM of L shoulder abd to 120 deg  in 3 weeks for improved ease with ADLs and overhead tasks. NOT MET  3. Pt will demo improved postural awareness with minimal cues for decreased stress to GH joint and spinal segments. MET    LT.Pt will be independent with HEP in 6 weeks for self management of symptoms and decreased risk for re-occurrence.  PROGRESSING  2.Pt will improve strength of L shoulder and scapular mm to grossly 4+/5 in 6 weeks for improved ease with overhead tasks. PROGRESSING  3.Pt will improve AROM of L shoulder flex and ABD to 165 degrees in 6 weeks for improved ease with overhead tasks and ADLs. NOT  MET  4.Pt will report reduction in worst pain level to <3/10 in 6 weeks for improved quality of life and return to PLOF.  NOT MET    Plan  Therapy options: will be seen for skilled physical therapy services  Planned modality interventions: cryotherapy, electrical stimulation/Russian stimulation, thermotherapy (hydrocollator packs), traction and ultrasound  Planned therapy interventions: manual therapy, neuromuscular re-education, soft tissue mobilization, spinal/joint mobilization, strengthening, stretching, therapeutic activities, home exercise program, functional ROM exercises and flexibility  Frequency: 1x week  Duration in weeks: 4  Treatment plan discussed with: patient  Plan details: Pt to return to MD for follow up next week. If pt reports benefit from session today, planning to continue PT. If pt continues without change after today, planning to hold until MD evaluation.       Progress toward previous goals: Partially Met      Recommendations: Continue with recommendations follow up with MD for further evaluation.   Timeframe: 1 month  Prognosis to achieve goals: fair    PT Signature: Valentina Shore, PT      Based upon review of the patient's progress and continued therapy plan, it is my medical opinion that Alexx Workman should continue physical therapy treatment at [facility]  [address] [place] [address] [phone].    Signature: __________________________________  David Lazo PA-C    Timed:         Manual Therapy:    25     mins  04435;     Therapeutic Exercise:    15     mins  77986;         Un-Timed:  Electrical Stimulation:    15     mins  42485 ( );  Traction     10     mins [id number]      Timed Treatment:   40   mins   Total Treatment:     65   mins

## 2020-07-31 ENCOUNTER — OFFICE VISIT (OUTPATIENT)
Dept: FAMILY MEDICINE CLINIC | Facility: CLINIC | Age: 50
End: 2020-07-31

## 2020-07-31 VITALS
SYSTOLIC BLOOD PRESSURE: 122 MMHG | BODY MASS INDEX: 27.35 KG/M2 | RESPIRATION RATE: 8 BRPM | WEIGHT: 191 LBS | TEMPERATURE: 97.3 F | HEIGHT: 70 IN | DIASTOLIC BLOOD PRESSURE: 80 MMHG | OXYGEN SATURATION: 98 % | HEART RATE: 80 BPM

## 2020-07-31 DIAGNOSIS — Z00.00 PREVENTATIVE HEALTH CARE: Primary | ICD-10-CM

## 2020-07-31 DIAGNOSIS — M79.622 LEFT UPPER ARM PAIN: ICD-10-CM

## 2020-07-31 DIAGNOSIS — Z12.5 SCREENING FOR PROSTATE CANCER: ICD-10-CM

## 2020-07-31 DIAGNOSIS — M25.512 ACUTE PAIN OF LEFT SHOULDER: ICD-10-CM

## 2020-07-31 PROCEDURE — 99396 PREV VISIT EST AGE 40-64: CPT | Performed by: INTERNAL MEDICINE

## 2020-08-06 DIAGNOSIS — M25.512 ACUTE PAIN OF LEFT SHOULDER: ICD-10-CM

## 2020-08-06 DIAGNOSIS — M79.622 LEFT UPPER ARM PAIN: ICD-10-CM

## 2020-08-07 ENCOUNTER — LAB (OUTPATIENT)
Dept: LAB | Facility: HOSPITAL | Age: 50
End: 2020-08-07

## 2020-08-07 DIAGNOSIS — E78.2 MIXED HYPERLIPIDEMIA: ICD-10-CM

## 2020-08-07 DIAGNOSIS — E11.65 UNCONTROLLED TYPE 2 DIABETES MELLITUS WITH HYPERGLYCEMIA (HCC): ICD-10-CM

## 2020-08-07 DIAGNOSIS — Z12.5 SCREENING FOR PROSTATE CANCER: ICD-10-CM

## 2020-08-07 DIAGNOSIS — E55.9 VITAMIN D DEFICIENCY: ICD-10-CM

## 2020-08-07 LAB
ALBUMIN SERPL-MCNC: 4.7 G/DL (ref 3.5–5.2)
ALBUMIN UR-MCNC: <1.2 MG/DL
ALBUMIN/GLOB SERPL: 2 G/DL
ALP SERPL-CCNC: 59 U/L (ref 39–117)
ALT SERPL W P-5'-P-CCNC: 24 U/L (ref 1–41)
ANION GAP SERPL CALCULATED.3IONS-SCNC: 11.4 MMOL/L (ref 5–15)
AST SERPL-CCNC: 14 U/L (ref 1–40)
BILIRUB SERPL-MCNC: 0.6 MG/DL (ref 0–1.2)
BUN SERPL-MCNC: 22 MG/DL (ref 6–20)
BUN/CREAT SERPL: 19.3 (ref 7–25)
CALCIUM SPEC-SCNC: 9.5 MG/DL (ref 8.6–10.5)
CHLORIDE SERPL-SCNC: 100 MMOL/L (ref 98–107)
CHOLEST SERPL-MCNC: 115 MG/DL (ref 0–200)
CO2 SERPL-SCNC: 23.6 MMOL/L (ref 22–29)
CREAT SERPL-MCNC: 1.14 MG/DL (ref 0.76–1.27)
CREAT UR-MCNC: 120.7 MG/DL
GFR SERPL CREATININE-BSD FRML MDRD: 68 ML/MIN/1.73
GLOBULIN UR ELPH-MCNC: 2.4 GM/DL
GLUCOSE SERPL-MCNC: 118 MG/DL (ref 65–99)
HBA1C MFR BLD: 6.9 % (ref 3.5–5.6)
HDLC SERPL-MCNC: 37 MG/DL (ref 40–60)
LDLC SERPL CALC-MCNC: 32 MG/DL (ref 0–100)
LDLC/HDLC SERPL: 0.88 {RATIO}
MICROALBUMIN/CREAT UR: NORMAL MG/G{CREAT}
POTASSIUM SERPL-SCNC: 4.4 MMOL/L (ref 3.5–5.2)
PROT SERPL-MCNC: 7.1 G/DL (ref 6–8.5)
PSA SERPL-MCNC: 0.49 NG/ML (ref 0–4)
SODIUM SERPL-SCNC: 135 MMOL/L (ref 136–145)
TRIGL SERPL-MCNC: 228 MG/DL (ref 0–150)
VLDLC SERPL-MCNC: 45.6 MG/DL (ref 5–40)

## 2020-08-07 PROCEDURE — G0103 PSA SCREENING: HCPCS

## 2020-08-07 PROCEDURE — 36415 COLL VENOUS BLD VENIPUNCTURE: CPT

## 2020-08-07 PROCEDURE — 80053 COMPREHEN METABOLIC PANEL: CPT

## 2020-08-07 PROCEDURE — 82043 UR ALBUMIN QUANTITATIVE: CPT

## 2020-08-07 PROCEDURE — 83036 HEMOGLOBIN GLYCOSYLATED A1C: CPT

## 2020-08-07 PROCEDURE — 80061 LIPID PANEL: CPT

## 2020-08-07 PROCEDURE — 82570 ASSAY OF URINE CREATININE: CPT

## 2020-08-10 ENCOUNTER — OFFICE VISIT (OUTPATIENT)
Dept: ORTHOPEDIC SURGERY | Facility: CLINIC | Age: 50
End: 2020-08-10

## 2020-08-10 VITALS
HEIGHT: 70 IN | BODY MASS INDEX: 27.49 KG/M2 | WEIGHT: 192 LBS | DIASTOLIC BLOOD PRESSURE: 83 MMHG | SYSTOLIC BLOOD PRESSURE: 129 MMHG | HEART RATE: 65 BPM

## 2020-08-10 DIAGNOSIS — M75.02 ADHESIVE CAPSULITIS OF LEFT SHOULDER: Primary | ICD-10-CM

## 2020-08-10 PROCEDURE — 99203 OFFICE O/P NEW LOW 30 MIN: CPT | Performed by: FAMILY MEDICINE

## 2020-08-10 NOTE — PROGRESS NOTES
Primary Care Sports Medicine Office Visit Note     Patient ID: Alexx Workman is a 50 y.o. male.    Chief Complaint:  Chief Complaint   Patient presents with   • Left Shoulder - Pain     HPI:    Mr. Alexx Workman is a 50 y.o. male who presents to the clinic today for L shoulder pain. Insidious onset near 8 months ago, but pain worsened significantly about 2 months ago. Can no longer sleep on this side, awakes him from sleeping. Difficulty overhead, difficulty reaching behind. Points to the lateral deltoid when describing his pain, radiating pain, but no numbness or other neurologic complaint. Has seen Dr. Garcia his PCP, who ordered MRI 8/5/2020, see below. Has attempted therapy for 1 month, NSAIDs, steroid burst po.     Past Medical History:   Diagnosis Date   • Hyperlipidemia    • LEANNA (obstructive sleep apnea)    • Type 2 diabetes mellitus (CMS/HCC)        Past Surgical History:   Procedure Laterality Date   • NOSE SURGERY      laser nose   • TOE NAIL AMPUTATION     • TONSILLECTOMY         Family History   Problem Relation Age of Onset   • Lupus Mother    • Hypertension Mother    • Cancer Mother         lung / breast / skin   • Diabetes Father    • Cancer Father         myeloma    • Diabetes Brother      Social History     Occupational History   • Not on file   Tobacco Use   • Smoking status: Never Smoker   • Smokeless tobacco: Never Used   Substance and Sexual Activity   • Alcohol use: No     Frequency: Never   • Drug use: Never   • Sexual activity: Defer      Review of Systems   Constitutional: Negative for activity change and fever.   Respiratory: Negative for cough and shortness of breath.    Cardiovascular: Negative for chest pain.   Gastrointestinal: Negative for constipation, diarrhea, nausea and vomiting.   Musculoskeletal: Positive for arthralgias.   Skin: Negative for color change and rash.   Neurological: Negative for weakness.   Hematological: Does not bruise/bleed easily.       Objective:    /83    "Pulse 65   Ht 177.8 cm (70\")   Wt 87.1 kg (192 lb)   BMI 27.55 kg/m²     Physical Examination:  Physical Exam   Constitutional: He appears well-developed and well-nourished. No distress.   HENT:   Head: Normocephalic and atraumatic.   Eyes: Conjunctivae are normal.   Cardiovascular: Intact distal pulses.   Pulmonary/Chest: Effort normal. No respiratory distress.   Neurological: He is alert.   Skin: Skin is warm. Capillary refill takes less than 2 seconds. He is not diaphoretic.   Nursing note and vitals reviewed.    Left Shoulder Exam     Range of Motion   Active abduction:  120 abnormal   Passive abduction: abnormal   Extension: 50   External rotation: 50   Forward flexion:  120 abnormal   Internal rotation 0 degrees:  Sacrum abnormal     Muscle Strength   Abduction: 4/5   Internal rotation: 5/5   External rotation: 3/5   Supraspinatus: 3/5   Subscapularis: 5/5   Biceps: 5/5     Tests   Antoine test: positive  Cross arm: negative  Drop arm: negative  Sulcus: absent    Other   Erythema: absent  Sensation: normal  Pulse: present     Comments:  Mildly positive Chaz for pain, positive resisted external rotation for pain as well, negative liftoff.  Negative [place]'s, negative scarf.  Positive Neer.  Negative speeds/Yergason's.      Cervical range of motion is full with no tenderness to palpation to the bony midline or paraspinal cervical musculature.  Spurling's maneuver to the left is negative.            Imaging and other tests:  MRI dated 8/5/2020 shows the following IMPRESSION:   1. No evidence of partial or full-thickness rotator cuff tear.   2. There is no evidence for biceps tendon tear or labral tear.   3. Mild changes suggesting potential developing adhesive capsulitis. Recommend correlation with orthopedic exam.   4. Mild osteoarthritis of the glenohumeral joint. Mild age-related changes of the acromioclavicular joint are noted with associated reactive edema.    Assessment and Plan:    1. Adhesive " "capsulitis of left shoulder    I discussed this patient today that ultimately I feel he likely has spontaneous adhesive capsulitis secondary to diabetes mellitus.  I recommend he start physical therapy for stretching and strengthening of the shoulder joint.  Like for him to mobilize GH visit which is possible.  We discussed the expected longevity of this therapy.  Would like to see him back in 3 months to ensure improvement.    Joe OCASIO \"Chance\" John KEITH DO, CAQSM  08/11/20  12:54    Disclaimer: Please note that areas of this note were completed with computer voice recognition software.  Quite often unanticipated grammatical, syntax, homophones, and other interpretive errors are inadvertently transcribed by the computer software. Please excuse any errors that have escaped final proofreading.  "

## 2020-08-13 ENCOUNTER — TREATMENT (OUTPATIENT)
Dept: PHYSICAL THERAPY | Facility: CLINIC | Age: 50
End: 2020-08-13

## 2020-08-13 DIAGNOSIS — M75.02 ADHESIVE CAPSULITIS OF LEFT SHOULDER: Primary | ICD-10-CM

## 2020-08-13 DIAGNOSIS — G89.29 CHRONIC LEFT SHOULDER PAIN: ICD-10-CM

## 2020-08-13 DIAGNOSIS — M25.512 CHRONIC LEFT SHOULDER PAIN: ICD-10-CM

## 2020-08-13 PROCEDURE — 97161 PT EVAL LOW COMPLEX 20 MIN: CPT | Performed by: PHYSICAL THERAPIST

## 2020-08-13 PROCEDURE — 97110 THERAPEUTIC EXERCISES: CPT | Performed by: PHYSICAL THERAPIST

## 2020-08-13 PROCEDURE — 97140 MANUAL THERAPY 1/> REGIONS: CPT | Performed by: PHYSICAL THERAPIST

## 2020-08-13 NOTE — PROGRESS NOTES
Physical Therapy Initial Evaluation and Plan of Care    Patient: Alexx Workman   : 1970  Diagnosis/ICD-10 Code:  Adhesive capsulitis of left shoulder [M75.02]  Referring practitioner: Joe Lopez I*    Subjective Evaluation    History of Present Illness  Mechanism of injury: PMH: Type II diabetes, hyperlipidemia, prostate cancer, sleep apnea - see MR for full history    No specific mechanism of injury per patient    Subjective comment: Mechanism of injury: Pt is a 50 year old male who reports insidious onset of L arm pain that starts in deltoid mm and radiates into elbow and forearm beginning 4 months ago.  Had PT for a few visits but little improvement yet so scheduled with orthopedic MD for further assesment.  Reports went to ortho yesterday and after exam and review of MRI results was diagnosed with frozen shoulder.  Patient Occupation:   Pain  Current pain ratin  At worst pain ratin  Pain location: Left shoulder   Quality: sharp and dull ache  Relieving factors: rest and change in position  Aggravating factors: lifting, movement and overhead activity  Progression: no change    Diagnostic Tests  MRI studies: abnormal (See media - changes not to inferior capsule)    Treatments  Previous treatment: physical therapy  Current treatment: physical therapy  Patient Goals  Patient goals for therapy: decreased pain, increased motion, increased strength, independence with ADLs/IADLs and return to sport/leisure activities             Objective          Active Range of Motion   Left Shoulder   Flexion: 141 degrees   Abduction: 85 degrees   External rotation 90°: 65 degrees   Internal rotation BTB: L4     Right Shoulder   Flexion: 168 degrees   Abduction: 158 degrees   External rotation 90°: 85 degrees  Internal rotation BTB: T7     Joint Play   Left Shoulder  Joints within functional limits are the anterior capsule and posterior capsule. Hypomobile in the inferior capsule.    Right  Shoulder  Joints within functional limits are the anterior capsule, posterior capsule and inferior capsule.     Strength/Myotome Testing     Left Shoulder     Planes of Motion   Flexion: 4   External rotation at 45°: 4+   Internal rotation at 45°: 5     Right Shoulder     Planes of Motion   Flexion: 5   External rotation at 45°: 5   Internal rotation at 45°: 5     Additional Strength Details   strength: R 120 lbs. 105 lbs.           Assessment & Plan     Assessment  Impairments: abnormal or restricted ROM, activity intolerance, impaired physical strength, lacks appropriate home exercise program and pain with function  Assessment details: Presents with left shoulder pain, limits in passive and active left shoulder ROM, decreased left shoulder strength, limits in left shoulder capsular mobility and activity limits per the above  Prognosis: good  Functional Limitations: carrying objects, lifting, sleeping, pulling, pushing, uncomfortable because of pain, reaching overhead and unable to perform repetitive tasks  Goals  Plan Goals: ST. Left shoulder pain decreased 15% or greater over 2 weeks.   2. Left shoulder FL AROM improved 20 degrees or greater over 2 weeks.   3. Independent and compliant with HEP over 2 weeks.     LT. Quick DASH for ADL's 10% or less by discharge.  2. Left shoulder AROM all planes 10% or less deficit in comparison to right within 8 weeks.   3. Left shoulder strength = right within 8 weeks.     Plan  Therapy options: will be seen for skilled physical therapy services  Planned modality interventions: cryotherapy, microcurrent electrical stimulation, electrical stimulation/Russian stimulation, TENS, thermotherapy (hydrocollator packs) and ultrasound  Planned therapy interventions: manual therapy, abdominal trunk stabilization, neuromuscular re-education, soft tissue mobilization, flexibility, functional ROM exercises, strengthening, stretching, home exercise program, therapeutic  activities and joint mobilization  Frequency: 2x week  Duration in visits: 10  Treatment plan discussed with: patient          Timed:         Manual Therapy:    14     mins  75894;     Therapeutic Exercise:    12     mins  92510;         Un-Timed:  Low Eval     20     Mins  [id number]      Timed Treatment:   26   mins   Total Treatment:     46   mins    PT SIGNATURE: Yoni Mckeon PT, DPT      DATE TREATMENT INITIATED: 8/13/2020    Initial Certification  Certification Period: 11/11/2020  I certify that the therapy services are furnished while this patient is under my care.  The services outlined above are required by this patient, and will be reviewed every 90 days.     PHYSICIAN: Joe Lopez II, DO      DATE:     Please sign and return via fax to [phone].. Thank you, [facility].

## 2020-08-14 ENCOUNTER — OFFICE VISIT (OUTPATIENT)
Dept: ENDOCRINOLOGY | Facility: CLINIC | Age: 50
End: 2020-08-14

## 2020-08-14 VITALS
TEMPERATURE: 97 F | HEART RATE: 68 BPM | HEIGHT: 70 IN | OXYGEN SATURATION: 99 % | SYSTOLIC BLOOD PRESSURE: 122 MMHG | DIASTOLIC BLOOD PRESSURE: 75 MMHG | BODY MASS INDEX: 27.06 KG/M2 | WEIGHT: 189 LBS

## 2020-08-14 DIAGNOSIS — E78.2 MIXED HYPERLIPIDEMIA: ICD-10-CM

## 2020-08-14 DIAGNOSIS — E11.65 UNCONTROLLED TYPE 2 DIABETES MELLITUS WITH HYPERGLYCEMIA (HCC): Primary | ICD-10-CM

## 2020-08-14 DIAGNOSIS — R73.9 HYPERGLYCEMIA: ICD-10-CM

## 2020-08-14 DIAGNOSIS — E55.9 VITAMIN D DEFICIENCY: ICD-10-CM

## 2020-08-14 LAB
GLUCOSE BLDC GLUCOMTR-MCNC: 131 MG/DL (ref 70–105)
GLUCOSE BLDC GLUCOMTR-MCNC: 131 MG/DL (ref 70–130)

## 2020-08-14 PROCEDURE — 82962 GLUCOSE BLOOD TEST: CPT | Performed by: INTERNAL MEDICINE

## 2020-08-14 PROCEDURE — 99214 OFFICE O/P EST MOD 30 MIN: CPT | Performed by: INTERNAL MEDICINE

## 2020-08-14 NOTE — PATIENT INSTRUCTIONS
Continue current medications  Continue to work on your diet and activity  Annual eye exam and flu vaccine  Follow-up in 6 months with labs.

## 2020-08-14 NOTE — PROGRESS NOTES
Endocrine Progress Note Outpatient     Patient Care Team:  Elaine Garcia MD as PCP - General (Internal Medicine)  David Lazo PA-C as Physician Assistant (Physician Assistant)    Chief Complaint: Follow-up type 2 diabetes    HPI: 50-year-old male with history of type 2 diabetes, hyperlipidemia and vitamin D deficiency is here for follow-up.    For type 2 diabetes he is currently on Janumet XR 50/1000, 2 tablets daily and Jardiance 10 mg p.o. daily.  He is tolerating his medications well.  Unfortunately has not been checking blood sugars at home and has not been able to follow diet due to the cold situation.  Did get eye exam in the last 1 year.  Hyperlipidemia: On atorvastatin  Vitamin D deficiency: On replacement.    Past Medical History:   Diagnosis Date   • Frozen shoulder    • Hyperlipidemia    • LEANNA (obstructive sleep apnea)    • Type 2 diabetes mellitus (CMS/[facility])        Social History     Socioeconomic History   • Marital status:      Spouse name: Not on file   • Number of children: Not on file   • Years of education: Not on file   • Highest education level: Not on file   Tobacco Use   • Smoking status: Never Smoker   • Smokeless tobacco: Never Used   Substance and Sexual Activity   • Alcohol use: No     Frequency: Never   • Drug use: Never   • Sexual activity: Defer       Family History   Problem Relation Age of Onset   • Lupus Mother    • Hypertension Mother    • Cancer Mother         lung / breast / skin   • Diabetes Father    • Cancer Father         myeloma    • Diabetes Brother        No Known Allergies    ROS:   Constitutional:  Denies fatigue, tiredness.    Eyes:  Denies change in visual acuity   HENT:  Denies nasal congestion or sore throat   Respiratory: denies cough, shortness of breath.   Cardiovascular:  denies chest pain, edema   GI:  Denies abdominal pain, nausea, vomiting.   Musculoskeletal:  Denies back pain or joint pain   Integument:  Denies dry skin and rash    Neurologic:  Denies headache, focal weakness or sensory changes   Endocrine:  Denies polyuria or polydipsia   Psychiatric:  Denies depression or anxiety      Vitals:    08/14/20 1039   BP: 122/75   Pulse: 68   Temp: 97 °F (36.1 °C)   SpO2: 99%       Physical Exam:  GEN: NAD, conversant  EYES: EOMI, PERRL, no conjunctival erythema  NECK: no thyromegaly, full ROM   CV: RRR, no murmurs/rubs/gallops, no peripheral edema  LUNG: CTAB, no wheezes/rales/ronchi  SKIN: no rashes, no acanthosis  MSK: no deformities, full ROM of all extremities  NEURO: no tremors, DTR normal  PSYCH: AOX3, appropriate mood, affect normal      Results Review:     I reviewed the patient's new clinical results.    Lab Results   Component Value Date    HGBA1C 6.9 (H) 08/07/2020    HGBA1C 7.5 (H) 11/26/2019      Lab Results   Component Value Date    GLUCOSE 118 (H) 08/07/2020    BUN 22 (H) 08/07/2020    CREATININE 1.14 08/07/2020    EGFRIFNONA 68 08/07/2020    BCR 19.3 08/07/2020    K 4.4 08/07/2020    CO2 23.6 08/07/2020    CALCIUM 9.5 08/07/2020    ALBUMIN 4.70 08/07/2020    LABIL2 1.6 03/01/2019    AST 14 08/07/2020    ALT 24 08/07/2020    CHOL 115 08/07/2020    TRIG 228 (H) 08/07/2020    LDL 32 08/07/2020    HDL 37 (L) 08/07/2020         Medication Review: Reviewed.       Current Outpatient Medications:   •  atorvastatin (LIPITOR) 10 MG tablet, TAKE ONE TABLET BY MOUTH DAILY, Disp: 90 tablet, Rfl: 2  •  Cholecalciferol (VITAMIN D3) 125 MCG (5000 UT) capsule capsule, VITAMIN D3 5000 UNIT CAPS, Disp: , Rfl:   •  Cyanocobalamin (VITAMIN B-12) 1000 MCG sublingual tablet, PLACE ONE TABLET UNDER THE TONGUE DAILY, Disp: 90 each, Rfl: 2  •  glucose blood (ONE TOUCH ULTRA TEST) test strip, ONETOUCH ULTRA BLUE STRP, Disp: , Rfl:   •  JANUMET XR  MG tablet, TAKE ONE TABLET BY MOUTH TWICE A DAY, Disp: 60 tablet, Rfl: 3  •  JARDIANCE 10 MG tablet, TAKE ONE TABLET BY MOUTH DAILY, Disp: 30 tablet, Rfl: 3      Assessment/Plan   1.  Diabetes mellitus  type 2: Improved with A1c at 6.9%.  Continue current medications.  Please work on your diet and activity  Annual eye exam and flu vaccine.    2.  Hyperlipidemia: LDL is excellent at 32 however he had does have high triglycerides of 228.  He needs to work on his diet, reduce red meat, fried food and artificial sweeteners.  Continue atorvastatin for now.    3.  Vitamin D deficiency: On vitamin D supplementation.            Ino Jean MD FACE.    Much of the above report is an electronic transcription/translation of the spoken language to printed text using Dragon Software. As such, the subtleties and finesse of the spoken language may permit erroneous, or at times, nonsensical words or phrases to be inadvertently transcribed; thus changes may be made at a later date to rectify these errors.

## 2020-08-19 ENCOUNTER — TREATMENT (OUTPATIENT)
Dept: PHYSICAL THERAPY | Facility: CLINIC | Age: 50
End: 2020-08-19

## 2020-08-19 DIAGNOSIS — M75.02 ADHESIVE CAPSULITIS OF LEFT SHOULDER: Primary | ICD-10-CM

## 2020-08-19 PROCEDURE — 97140 MANUAL THERAPY 1/> REGIONS: CPT | Performed by: PHYSICAL THERAPIST

## 2020-08-19 PROCEDURE — 97110 THERAPEUTIC EXERCISES: CPT | Performed by: PHYSICAL THERAPIST

## 2020-08-19 NOTE — PROGRESS NOTES
Physical Therapy Daily Progress Note  Visit: 2          Patient: Alexx Workman   : 1970  Diagnosis/ICD-10 Code:  Adhesive capsulitis of left shoulder [M75.02]  Referring practitioner: Joe Lopez I*  Date of Initial Visit: Type: THERAPY  Noted: 2020  Today's Date: 2020      Subjective     Alexx Workman reports: no increase in pain since last visit, but has been avoiding any motion that would normally increase his symptoms.     Objective   See Exercise, Manual, and Modality Logs for complete treatment.       Assessment/Plan    Progressed scap and RC strengthening exercises today and focused on manual intervention for improved joint mobility and ROM. No adverse reactions to tx session. HEP updated.            Timed:         Manual Therapy:    15     mins  03204;     Therapeutic Exercise:    25     mins  60970;           Timed Treatment:   40   mins   Total Treatment:     40   mins  Valentina Shore PT  Physical Therapist

## 2020-08-26 ENCOUNTER — TREATMENT (OUTPATIENT)
Dept: PHYSICAL THERAPY | Facility: CLINIC | Age: 50
End: 2020-08-26

## 2020-08-26 DIAGNOSIS — M75.02 ADHESIVE CAPSULITIS OF LEFT SHOULDER: Primary | ICD-10-CM

## 2020-08-26 DIAGNOSIS — M25.512 CHRONIC LEFT SHOULDER PAIN: ICD-10-CM

## 2020-08-26 DIAGNOSIS — G89.29 CHRONIC LEFT SHOULDER PAIN: ICD-10-CM

## 2020-08-26 PROCEDURE — 97140 MANUAL THERAPY 1/> REGIONS: CPT | Performed by: PHYSICAL THERAPIST

## 2020-08-26 PROCEDURE — 97110 THERAPEUTIC EXERCISES: CPT | Performed by: PHYSICAL THERAPIST

## 2020-08-26 NOTE — PROGRESS NOTES
Physical Therapy Daily Progress Note  Visit: 3          Patient: Alexx Workman   : 1970  Diagnosis/ICD-10 Code:  Adhesive capsulitis of left shoulder [M75.02]  Referring practitioner: Joe Lopez I*  Date of Initial Visit: Type: THERAPY  Noted: 2020  Today's Date: 2020      Subjective     Alexx Workman reports: feeling much improved since last visit. Reports some mild stiffness in am, but pain has been significantly improved since last visit.     Objective   See Exercise, Manual, and Modality Logs for complete treatment.       Assessment/Plan    Added IR towel stretch today and adjusted ER stretch with wand due to pt performing incorrectly. Demo good carry over following cues. PROM nearing normal limits, but remains with pain at end range (last 15 deg). GH hypomobility still noted, but improved following joint mobs.       Timed:         Manual Therapy:    15     mins  61499;     Therapeutic Exercise:    25     mins  47155;         Timed Treatment:   40   mins   Total Treatment:     40   mins  Valentina Shore PT  Physical Therapist

## 2020-09-09 ENCOUNTER — TREATMENT (OUTPATIENT)
Dept: PHYSICAL THERAPY | Facility: CLINIC | Age: 50
End: 2020-09-09

## 2020-09-09 DIAGNOSIS — M75.02 ADHESIVE CAPSULITIS OF LEFT SHOULDER: Primary | ICD-10-CM

## 2020-09-09 DIAGNOSIS — G89.29 CHRONIC LEFT SHOULDER PAIN: ICD-10-CM

## 2020-09-09 DIAGNOSIS — M25.512 CHRONIC LEFT SHOULDER PAIN: ICD-10-CM

## 2020-09-09 PROCEDURE — 97110 THERAPEUTIC EXERCISES: CPT | Performed by: PHYSICAL THERAPIST

## 2020-09-09 PROCEDURE — 97140 MANUAL THERAPY 1/> REGIONS: CPT | Performed by: PHYSICAL THERAPIST

## 2020-09-09 NOTE — PROGRESS NOTES
Physical Therapy Daily Progress Note  Visit: 4          Patient: Alexx Workman   : 1970  Diagnosis/ICD-10 Code:  Adhesive capsulitis of left shoulder [M75.02]  Referring practitioner: Joe Lopez I*  Date of Initial Visit: Type: THERAPY  Noted: 2020  Today's Date: 2020      Subjective     Alexx Workman reports: no longer having sharp radiating pain into lateral arm. States he has pain with quick movement an stretching pain at end range.    Objective   See Exercise, Manual, and Modality Logs for complete treatment.       Assessment/Plan    Pt demo improving ease with ROM exercises and PROM. Demo improving joint mobility with manual interventions. Good carry over with strengthening exercises.       Timed:         Manual Therapy:    15     mins  28755;     Therapeutic Exercise:    25     mins  70627;         Timed Treatment:   40   mins   Total Treatment:     40   mins  Valentina Shore, PT  Physical Therapist

## 2020-09-16 ENCOUNTER — TREATMENT (OUTPATIENT)
Dept: PHYSICAL THERAPY | Facility: CLINIC | Age: 50
End: 2020-09-16

## 2020-09-16 DIAGNOSIS — M75.02 ADHESIVE CAPSULITIS OF LEFT SHOULDER: Primary | ICD-10-CM

## 2020-09-16 DIAGNOSIS — G89.29 CHRONIC LEFT SHOULDER PAIN: ICD-10-CM

## 2020-09-16 DIAGNOSIS — M25.512 CHRONIC LEFT SHOULDER PAIN: ICD-10-CM

## 2020-09-16 PROCEDURE — 97140 MANUAL THERAPY 1/> REGIONS: CPT | Performed by: PHYSICAL THERAPIST

## 2020-09-16 PROCEDURE — 97110 THERAPEUTIC EXERCISES: CPT | Performed by: PHYSICAL THERAPIST

## 2020-09-16 NOTE — PROGRESS NOTES
Re-Assessment / Re-Certification      Patient: Alexx Workman   : 1970  Diagnosis/ICD-10 Code:  Adhesive capsulitis of left shoulder [M75.02]  Referring practitioner: Joe Lopez I*  Date of Initial Visit: Type: THERAPY  Noted: 2020  Today's Date: 2020  Patient seen for 5 sessions      Subjective:   Alexx Workman reports: Feeling much better. ~77% improved since start of skilled PT. Reports stiffness in the morning, but eases as he moves his shoulder. Pain has been minimal. Feels he is ready to try HEP only.   Clinical Progress: improved  Home Program Compliance: Yes  Treatment has included: therapeutic exercise and manual therapy    Subjective   Objective          Active Range of Motion   Left Shoulder   Flexion: 170 degrees   Abduction: 170 degrees   External rotation 90°: 80 degrees   Internal rotation BTB: T10     Strength/Myotome Testing     Left Shoulder     Planes of Motion   Flexion: 5   Abduction: 4+   External rotation at 45°: 4+   Internal rotation at 45°: 5       Assessment & Plan     Assessment  Assessment details: Pt has attended 5 visits in skilled PT during this episode of care. Was also treated a few months prior for same condition. Pt reports ~77% improvement since start of skilled PT and feels he is ready to start d/c planning. Pt has met all STG and nearing LTG. ROM goals met and strength has progressed in all planes. Pt to trial 2 weeks of HEP and follow up if needed. Otherwise planning to d/c to HEP.   Prognosis: good    Goals  Plan Goals: ST. Left shoulder pain decreased 15% or greater over 2 weeks. MET  2. Left shoulder FL AROM improved 20 degrees or greater over 2 weeks.  MET  3. Independent and compliant with HEP over 2 weeks. MET    LT. Quick DASH for ADL's 10% or less by discharge.   2. Left shoulder AROM all planes 10% or less deficit in comparison to right within 8 weeks. MET  3. Left shoulder strength = right within 8 weeks.  PROGRESSING    Plan  Therapy options: will be seen for skilled physical therapy services  Planned modality interventions: cryotherapy, microcurrent electrical stimulation, electrical stimulation/Russian stimulation, TENS, thermotherapy (hydrocollator packs) and ultrasound  Planned therapy interventions: manual therapy, abdominal trunk stabilization, neuromuscular re-education, soft tissue mobilization, flexibility, functional ROM exercises, strengthening, stretching, home exercise program, therapeutic activities and joint mobilization  Treatment plan discussed with: patient  Plan details: Follow up in 2 weeks if needed.       Progress toward previous goals: Partially Met        Recommendations: Continue as planned  Prognosis to achieve goals: good    PT Signature: Valentina Shore, PT      Based upon review of the patient's progress and continued therapy plan, it is my medical opinion that Alexx Workman should continue physical therapy treatment at [facility]  [address] [place] [address] [address] [place] [id number] [phone].    Signature: __________________________________  Joe Lopez II,     Timed:         Manual Therapy:    10     mins  [id number];     Therapeutic Exercise:    30     mins  [id number];         Timed Treatment:   40   mins   Total Treatment:     40   mins

## 2020-09-23 RX ORDER — EMPAGLIFLOZIN 10 MG/1
TABLET, FILM COATED ORAL
Qty: 30 TABLET | Refills: 4 | Status: SHIPPED | OUTPATIENT
Start: 2020-09-23 | End: 2021-02-12

## 2020-09-23 RX ORDER — SITAGLIPTIN AND METFORMIN HYDROCHLORIDE 1000; 50 MG/1; MG/1
TABLET, FILM COATED, EXTENDED RELEASE ORAL
Qty: 60 TABLET | Refills: 4 | Status: SHIPPED | OUTPATIENT
Start: 2020-09-23 | End: 2021-02-12 | Stop reason: SDUPTHER

## 2020-10-20 ENCOUNTER — DOCUMENTATION (OUTPATIENT)
Dept: PHYSICAL THERAPY | Facility: CLINIC | Age: 50
End: 2020-10-20

## 2020-10-20 DIAGNOSIS — M25.512 CHRONIC LEFT SHOULDER PAIN: ICD-10-CM

## 2020-10-20 DIAGNOSIS — M67.922 TENDINOPATHY OF LEFT BICEPS TENDON: Primary | ICD-10-CM

## 2020-10-20 DIAGNOSIS — M75.02 ADHESIVE CAPSULITIS OF LEFT SHOULDER: Primary | ICD-10-CM

## 2020-10-20 DIAGNOSIS — G89.29 CHRONIC LEFT SHOULDER PAIN: ICD-10-CM

## 2020-10-20 NOTE — PROGRESS NOTES
Discharge Summary  Discharge Summary from Physical Therapy Report      Patient: Alexx Workman   : 1970  Diagnosis/ICD-10 Code:  Adhesive capsulitis of left shoulder [M75.02]      Discharge Status of Patient: See Progress Note dated 2020    Goals: Partially Met    Discharge Plan: Continue with current home exercise program as instructed    Comments Pt has attended 5 visits in skilled PT during this episode of care. Was also treated a few months prior for same condition. Pt reports ~77% improvement since start of skilled PT and feels he is ready to start d/c planning. Pt has met all STG and nearing LTG. ROM goals met and strength has progressed in all planes. D/c to HEP.       Valentina Shore, PT  Physical Therapist

## 2020-10-20 NOTE — PROGRESS NOTES
Discharge Summary  Discharge Summary from Physical Therapy Report      Patient: Alexx Workman   : 1970  Diagnosis/ICD-10 Code:  Tendinopathy of left biceps tendon [M67.922]      Discharge Status of Patient: See Progress Note dated 2020    Goals: Partially Met    Discharge Plan: Continue with current home exercise program as instructed    Comments Pt was d/c from this episode, but returned a month later for further care. Pt then with good results and was set up on HEP. See D/c summary dated 10/20/20 for details on recent episode.       Valentina Shore, PT  Physical Therapist

## 2020-10-27 RX ORDER — ATORVASTATIN CALCIUM 10 MG/1
TABLET, FILM COATED ORAL
Qty: 90 TABLET | Refills: 1 | Status: SHIPPED | OUTPATIENT
Start: 2020-10-27 | End: 2021-02-12 | Stop reason: SDUPTHER

## 2020-12-11 ENCOUNTER — TELEPHONE (OUTPATIENT)
Dept: FAMILY MEDICINE CLINIC | Facility: CLINIC | Age: 50
End: 2020-12-11

## 2020-12-11 NOTE — TELEPHONE ENCOUNTER
PATIENT CALLED REQUESTING TO KNOW WHERE DR. HARPER IS MOVING TO. HE WOULD LIKE A CALL BACK AT:    [phone]

## 2021-02-05 ENCOUNTER — LAB (OUTPATIENT)
Dept: LAB | Facility: HOSPITAL | Age: 51
End: 2021-02-05

## 2021-02-05 DIAGNOSIS — E55.9 VITAMIN D DEFICIENCY: ICD-10-CM

## 2021-02-05 DIAGNOSIS — E11.65 UNCONTROLLED TYPE 2 DIABETES MELLITUS WITH HYPERGLYCEMIA (HCC): ICD-10-CM

## 2021-02-05 DIAGNOSIS — E78.2 MIXED HYPERLIPIDEMIA: ICD-10-CM

## 2021-02-05 LAB
ALBUMIN SERPL-MCNC: 4.5 G/DL (ref 3.5–5.2)
ALBUMIN UR-MCNC: <1.2 MG/DL
ALBUMIN/GLOB SERPL: 2.4 G/DL
ALP SERPL-CCNC: 68 U/L (ref 39–117)
ALT SERPL W P-5'-P-CCNC: 20 U/L (ref 1–41)
ANION GAP SERPL CALCULATED.3IONS-SCNC: 13 MMOL/L (ref 5–15)
AST SERPL-CCNC: 18 U/L (ref 1–40)
BILIRUB SERPL-MCNC: 0.5 MG/DL (ref 0–1.2)
BUN SERPL-MCNC: 17 MG/DL (ref 6–20)
BUN/CREAT SERPL: 16.5 (ref 7–25)
CALCIUM SPEC-SCNC: 9.3 MG/DL (ref 8.6–10.5)
CHLORIDE SERPL-SCNC: 103 MMOL/L (ref 98–107)
CHOLEST SERPL-MCNC: 104 MG/DL (ref 0–200)
CO2 SERPL-SCNC: 25 MMOL/L (ref 22–29)
CREAT SERPL-MCNC: 1.03 MG/DL (ref 0.76–1.27)
CREAT UR-MCNC: 115.5 MG/DL
GFR SERPL CREATININE-BSD FRML MDRD: 76 ML/MIN/1.73
GLOBULIN UR ELPH-MCNC: 1.9 GM/DL
GLUCOSE SERPL-MCNC: 148 MG/DL (ref 65–99)
HBA1C MFR BLD: 7 % (ref 3.5–5.6)
HDLC SERPL-MCNC: 35 MG/DL (ref 40–60)
LDLC SERPL CALC-MCNC: 44 MG/DL (ref 0–100)
LDLC/HDLC SERPL: 1.13 {RATIO}
MICROALBUMIN/CREAT UR: NORMAL MG/G{CREAT}
POTASSIUM SERPL-SCNC: 4.3 MMOL/L (ref 3.5–5.2)
PROT SERPL-MCNC: 6.4 G/DL (ref 6–8.5)
SODIUM SERPL-SCNC: 141 MMOL/L (ref 136–145)
TRIGL SERPL-MCNC: 148 MG/DL (ref 0–150)
VLDLC SERPL-MCNC: 25 MG/DL (ref 5–40)

## 2021-02-05 PROCEDURE — 80061 LIPID PANEL: CPT

## 2021-02-05 PROCEDURE — 83036 HEMOGLOBIN GLYCOSYLATED A1C: CPT

## 2021-02-05 PROCEDURE — 36415 COLL VENOUS BLD VENIPUNCTURE: CPT

## 2021-02-05 PROCEDURE — 82570 ASSAY OF URINE CREATININE: CPT

## 2021-02-05 PROCEDURE — 80053 COMPREHEN METABOLIC PANEL: CPT

## 2021-02-05 PROCEDURE — 82043 UR ALBUMIN QUANTITATIVE: CPT

## 2021-02-12 ENCOUNTER — TELEMEDICINE (OUTPATIENT)
Dept: ENDOCRINOLOGY | Facility: CLINIC | Age: 51
End: 2021-02-12

## 2021-02-12 VITALS — HEIGHT: 70 IN | BODY MASS INDEX: 26.2 KG/M2 | WEIGHT: 183 LBS

## 2021-02-12 DIAGNOSIS — E55.9 VITAMIN D DEFICIENCY: ICD-10-CM

## 2021-02-12 DIAGNOSIS — E11.65 UNCONTROLLED TYPE 2 DIABETES MELLITUS WITH HYPERGLYCEMIA (HCC): Primary | ICD-10-CM

## 2021-02-12 DIAGNOSIS — E78.2 MIXED HYPERLIPIDEMIA: ICD-10-CM

## 2021-02-12 PROCEDURE — 99214 OFFICE O/P EST MOD 30 MIN: CPT | Performed by: INTERNAL MEDICINE

## 2021-02-12 RX ORDER — SITAGLIPTIN AND METFORMIN HYDROCHLORIDE 1000; 50 MG/1; MG/1
TABLET, FILM COATED, EXTENDED RELEASE ORAL
Qty: 180 TABLET | Refills: 4 | Status: SHIPPED | OUTPATIENT
Start: 2021-02-12 | End: 2022-02-15 | Stop reason: SDUPTHER

## 2021-02-12 RX ORDER — EMPAGLIFLOZIN 25 MG/1
25 TABLET, FILM COATED ORAL DAILY
Qty: 90 TABLET | Refills: 4 | Status: SHIPPED | OUTPATIENT
Start: 2021-02-12 | End: 2022-02-15 | Stop reason: SDUPTHER

## 2021-02-12 RX ORDER — ATORVASTATIN CALCIUM 10 MG/1
10 TABLET, FILM COATED ORAL DAILY
Qty: 90 TABLET | Refills: 4 | Status: SHIPPED | OUTPATIENT
Start: 2021-02-12 | End: 2022-02-15 | Stop reason: SDUPTHER

## 2021-02-12 NOTE — PROGRESS NOTES
Endocrine Progress Note Outpatient     Patient Care Team:  David Lazo PA-C as PCP - General (Physician Assistant)  You have chosen to receive care through a telehealth visit.  Do you consent to use a video/audio connection for your medical care today? Yes.     Chief Complaint: Follow-up type 2 diabetes: Visit conducted via [facility]    HPI: 50-year-old male with history of type 2 diabetes, hyperlipidemia and vitamin D deficiency is here for follow-up.    For type 2 diabetes he is currently on Janumet XR 50/1000, 2 tablets daily and Jardiance 10 mg p.o. daily.  He is tolerating his medications well.  Unfortunately has not been checking blood sugars at home and has not been able to follow diet.       Hyperlipidemia: On atorvastatin    Vitamin D deficiency: On replacement.    Past Medical History:   Diagnosis Date   • Frozen shoulder    • Hyperlipidemia    • LEANNA (obstructive sleep apnea)    • Type 2 diabetes mellitus (CMS/[facility])        Social History     Socioeconomic History   • Marital status:      Spouse name: Not on file   • Number of children: Not on file   • Years of education: Not on file   • Highest education level: Not on file   Tobacco Use   • Smoking status: Never Smoker   • Smokeless tobacco: Never Used   Substance and Sexual Activity   • Alcohol use: No     Frequency: Never   • Drug use: Never   • Sexual activity: Defer       Family History   Problem Relation Age of Onset   • Lupus Mother    • Hypertension Mother    • Cancer Mother         lung / breast / skin   • Diabetes Father    • Cancer Father         myeloma    • Diabetes Brother        No Known Allergies    ROS:   Constitutional:  Denies fatigue, tiredness.    Eyes:  Denies change in visual acuity   HENT:  Denies nasal congestion or sore throat   Respiratory: denies cough, shortness of breath.   Cardiovascular:  denies chest pain, edema   GI:  Denies abdominal pain, nausea, vomiting.   Musculoskeletal:  Denies back pain or joint pain    Integument:  Denies dry skin and rash   Neurologic:  Denies headache, focal weakness or sensory changes   Endocrine:  Denies polyuria or polydipsia   Psychiatric:  Denies depression or anxiety      There were no vitals filed for this visit.    Physical Exam:  GEN: NAD, conversant  PSYCH: AOX3, appropriate mood, affect normal      Results Review:     I reviewed the patient's new clinical results.    Lab Results   Component Value Date    HGBA1C 7.0 (H) 02/05/2021    HGBA1C 6.9 (H) 08/07/2020    HGBA1C 7.5 (H) 11/26/2019      Lab Results   Component Value Date    GLUCOSE 148 (H) 02/05/2021    BUN 17 02/05/2021    CREATININE 1.03 02/05/2021    EGFRIFNONA 76 02/05/2021    BCR 16.5 02/05/2021    K 4.3 02/05/2021    CO2 25.0 02/05/2021    CALCIUM 9.3 02/05/2021    ALBUMIN 4.50 02/05/2021    LABIL2 1.6 03/01/2019    AST 18 02/05/2021    ALT 20 02/05/2021    CHOL 104 02/05/2021    TRIG 148 02/05/2021    LDL 44 02/05/2021    HDL 35 (L) 02/05/2021         Medication Review: Reviewed.       Current Outpatient Medications:   •  atorvastatin (LIPITOR) 10 MG tablet, TAKE ONE TABLET BY MOUTH DAILY, Disp: 90 tablet, Rfl: 1  •  Cholecalciferol (VITAMIN D3) 125 MCG (5000 UT) capsule capsule, VITAMIN D3 5000 UNIT CAPS, Disp: , Rfl:   •  Cyanocobalamin (VITAMIN B-12) 1000 MCG sublingual tablet, PLACE ONE TABLET UNDER THE TONGUE DAILY, Disp: 90 each, Rfl: 2  •  glucose blood (ONE TOUCH ULTRA TEST) test strip, ONETOUCH ULTRA BLUE STRP, Disp: , Rfl:   •  Janumet XR  MG tablet, TAKE ONE TABLET BY MOUTH TWICE A DAY, Disp: 60 tablet, Rfl: 4  •  Jardiance 10 MG tablet, TAKE ONE TABLET BY MOUTH DAILY, Disp: 30 tablet, Rfl: 4      Assessment/Plan   1.  Diabetes mellitus type 2: Improved with A1c at 7%.  Will increase Jardiance to 25 mg p.o. daily and continue Janumet.  Continue current medications.  Please work on your diet and activity  Annual eye exam and flu vaccine.    2.  Hyperlipidemia: LDL is excellent at 44 and  triglycerides  148.  Continue atorvastatin for now.    3.  Vitamin D deficiency: On vitamin D supplementation.            Ino Jean MD FACE.    Much of the above report is an electronic transcription/translation of the spoken language to printed text using Dragon Software. As such, the subtleties and finesse of the spoken language may permit erroneous, or at times, nonsensical words or phrases to be inadvertently transcribed; thus changes may be made at a later date to rectify these errors.

## 2021-02-12 NOTE — PATIENT INSTRUCTIONS
Increase Jardiance to 25 mg p.o. daily  Continue rest of the medications  Continue to work on your diet and activity  Annual eye exam and flu vaccine  Labs before follow-up.

## 2021-03-19 RX ORDER — MAGNESIUM 200 MG
TABLET ORAL
Qty: 90 EACH | Refills: 1 | Status: SHIPPED | OUTPATIENT
Start: 2021-03-19

## 2021-03-22 ENCOUNTER — TELEPHONE (OUTPATIENT)
Dept: ENDOCRINOLOGY | Facility: CLINIC | Age: 51
End: 2021-03-22

## 2021-03-22 NOTE — TELEPHONE ENCOUNTER
Patient states the sublingual B12 is on back order and pharmacy does not anticipate getting any in. Do you want him to switch to tablet form?

## 2021-08-02 ENCOUNTER — OFFICE VISIT (OUTPATIENT)
Dept: FAMILY MEDICINE CLINIC | Facility: CLINIC | Age: 51
End: 2021-08-02

## 2021-08-02 VITALS
WEIGHT: 192 LBS | BODY MASS INDEX: 27.49 KG/M2 | RESPIRATION RATE: 16 BRPM | OXYGEN SATURATION: 98 % | HEIGHT: 70 IN | SYSTOLIC BLOOD PRESSURE: 124 MMHG | HEART RATE: 90 BPM | DIASTOLIC BLOOD PRESSURE: 80 MMHG

## 2021-08-02 DIAGNOSIS — Z12.11 COLON CANCER SCREENING: ICD-10-CM

## 2021-08-02 DIAGNOSIS — L57.0 ACTINIC KERATOSIS OF LEFT TEMPLE: ICD-10-CM

## 2021-08-02 DIAGNOSIS — M75.01 ADHESIVE CAPSULITIS OF RIGHT SHOULDER ASSOCIATED WITH TYPE 2 DIABETES MELLITUS (HCC): ICD-10-CM

## 2021-08-02 DIAGNOSIS — E11.618 ADHESIVE CAPSULITIS OF RIGHT SHOULDER ASSOCIATED WITH TYPE 2 DIABETES MELLITUS (HCC): ICD-10-CM

## 2021-08-02 DIAGNOSIS — Z00.00 ANNUAL PHYSICAL EXAM: Primary | ICD-10-CM

## 2021-08-02 DIAGNOSIS — Z12.5 SCREENING PSA (PROSTATE SPECIFIC ANTIGEN): ICD-10-CM

## 2021-08-02 PROCEDURE — 99396 PREV VISIT EST AGE 40-64: CPT | Performed by: PHYSICIAN ASSISTANT

## 2021-08-02 NOTE — PROGRESS NOTES
"Subjective   Alexx JOSE ARMANDO Workman is a 51 y.o. male.     Chief Complaint   Patient presents with   • Annual Exam       /80 (BP Location: Left arm, Patient Position: Sitting, Cuff Size: Adult)   Pulse 90   Resp 16   Ht 177.8 cm (70\")   Wt 87.1 kg (192 lb)   SpO2 98%   BMI 27.55 kg/m²     BP Readings from Last 3 Encounters:   08/02/21 124/80   08/14/20 122/75   08/10/20 129/83       Wt Readings from Last 3 Encounters:   08/02/21 87.1 kg (192 lb)   02/12/21 83 kg (183 lb)   08/14/20 85.7 kg (189 lb)       HPI Patient presents to the clinic for follow-up of diabetes, right shoulder pain, and colon cancer screening .  Patient takes janumet XR and jardiance for his diabetes. Follows with endo. On lipitor as well. Has labs upcoming with . He is trying to exercise at this time. He does have right shoulder pain that was similar to the left shoulder pain when he was diagnosed with frozen shoulder. Saw eye doctor in the past 1 year. Had no rx changes. No soa, chest pain, headaches. Has skin lesion on the left temple that he would like looked at.       The following portions of the patient's history were reviewed and updated as appropriate: allergies, current medications, past family history, past medical history, past social history, past surgical history and problem list.    Review of Systems   Constitutional: Negative for activity change, appetite change and fatigue.   HENT: Negative for congestion, rhinorrhea and sore throat.    Eyes: Negative for blurred vision, pain and visual disturbance.   Respiratory: Negative for cough, chest tightness and shortness of breath.    Cardiovascular: Negative for chest pain and leg swelling.   Gastrointestinal: Negative for abdominal pain, blood in stool and nausea.   Endocrine: Negative for polydipsia and polyuria.   Genitourinary: Negative for dysuria and urgency.   Musculoskeletal: Positive for arthralgias (right shoulder pain ). Negative for back pain.   Skin: Negative for " rash and bruise.   Allergic/Immunologic: Negative for environmental allergies.   Neurological: Negative for headache and confusion.   Hematological: Negative for adenopathy. Does not bruise/bleed easily.   Psychiatric/Behavioral: Negative for stress. The patient is not nervous/anxious.        Objective   Physical Exam  Constitutional:       Appearance: He is well-developed.   HENT:      Head: Normocephalic and atraumatic.   Eyes:      Conjunctiva/sclera: Conjunctivae normal.      Pupils: Pupils are equal, round, and reactive to light.   Cardiovascular:      Rate and Rhythm: Normal rate and regular rhythm.      Heart sounds: No murmur heard.     Pulmonary:      Effort: Pulmonary effort is normal.      Breath sounds: Normal breath sounds.   Abdominal:      General: Bowel sounds are normal.      Palpations: Abdomen is soft.      Tenderness: There is no abdominal tenderness.   Musculoskeletal:         General: No deformity. Normal range of motion.      Cervical back: Normal range of motion and neck supple.      Comments: Limited internal rotation of the shoulder. Negative kaba and drop can. No pain with abduction. Has some pain with internal and external rotation.    Lymphadenopathy:      Cervical: No cervical adenopathy.   Skin:     General: Skin is warm and dry.      Capillary Refill: Capillary refill takes less than 2 seconds.      Findings: No rash.      Comments: Erythematous flat rough skin lesion to the left temple. Not bleeding.    Neurological:      Mental Status: He is alert and oriented to person, place, and time.      Cranial Nerves: No cranial nerve deficit.   Psychiatric:         Behavior: Behavior normal.         Thought Content: Thought content normal.         Judgment: Judgment normal.           Diagnoses and all orders for this visit:    1. Annual physical exam (Primary)    2. Screening PSA (prostate specific antigen)  -     PSA SCREENING; Future    3. Colon cancer screening  -     Ambulatory  Referral For Screening Colonoscopy    4. Actinic keratosis of left temple  -     Ambulatory Referral to Dermatology    5. Adhesive capsulitis of right shoulder associated with type 2 diabetes mellitus (CMS/HCC)       lifestyle modifications including healthy eating habits and exercise. In general we discussed the benefits of a food plan based on higher intake of plant based foods over meats. When eating meat, fish should be the first choice followed by other forms of meat. Red meat sparingly. Avoid processed foods and eat whole and organic foods if possible. Do not drink calories on a regular basis. Try incorporating olive oil or avocado oil rather than butter into your daily eating habits. Try daily exercise for at least 15 min per day 5 days a week. This can persist of any activity to increase your heart rate and break a sweat.       Return in about 1 year (around 8/2/2022), or if symptoms worsen or fail to improve.

## 2021-08-06 ENCOUNTER — LAB (OUTPATIENT)
Dept: LAB | Facility: HOSPITAL | Age: 51
End: 2021-08-06

## 2021-08-06 DIAGNOSIS — E11.65 UNCONTROLLED TYPE 2 DIABETES MELLITUS WITH HYPERGLYCEMIA (HCC): ICD-10-CM

## 2021-08-06 DIAGNOSIS — Z12.5 SCREENING PSA (PROSTATE SPECIFIC ANTIGEN): ICD-10-CM

## 2021-08-06 DIAGNOSIS — E78.2 MIXED HYPERLIPIDEMIA: ICD-10-CM

## 2021-08-06 LAB
ALBUMIN SERPL-MCNC: 4.6 G/DL (ref 3.5–5.2)
ALBUMIN UR-MCNC: 2.8 MG/DL
ALBUMIN/GLOB SERPL: 1.8 G/DL
ALP SERPL-CCNC: 74 U/L (ref 39–117)
ALT SERPL W P-5'-P-CCNC: 23 U/L (ref 1–41)
ANION GAP SERPL CALCULATED.3IONS-SCNC: 10.8 MMOL/L (ref 5–15)
AST SERPL-CCNC: 14 U/L (ref 1–40)
BILIRUB SERPL-MCNC: 0.3 MG/DL (ref 0–1.2)
BUN SERPL-MCNC: 23 MG/DL (ref 6–20)
BUN/CREAT SERPL: 19.8 (ref 7–25)
CALCIUM SPEC-SCNC: 9.3 MG/DL (ref 8.6–10.5)
CHLORIDE SERPL-SCNC: 102 MMOL/L (ref 98–107)
CHOLEST SERPL-MCNC: 121 MG/DL (ref 0–200)
CO2 SERPL-SCNC: 25.2 MMOL/L (ref 22–29)
CREAT SERPL-MCNC: 1.16 MG/DL (ref 0.76–1.27)
CREAT UR-MCNC: 84.5 MG/DL
GFR SERPL CREATININE-BSD FRML MDRD: 66 ML/MIN/1.73
GLOBULIN UR ELPH-MCNC: 2.6 GM/DL
GLUCOSE SERPL-MCNC: 135 MG/DL (ref 65–99)
HBA1C MFR BLD: 7.1 % (ref 3.5–5.6)
HDLC SERPL-MCNC: 38 MG/DL (ref 40–60)
LDLC SERPL CALC-MCNC: 62 MG/DL (ref 0–100)
LDLC/HDLC SERPL: 1.58 {RATIO}
MICROALBUMIN/CREAT UR: 33.1 MG/G
POTASSIUM SERPL-SCNC: 4.4 MMOL/L (ref 3.5–5.2)
PROT SERPL-MCNC: 7.2 G/DL (ref 6–8.5)
PSA SERPL-MCNC: 0.84 NG/ML (ref 0–4)
SODIUM SERPL-SCNC: 138 MMOL/L (ref 136–145)
TRIGL SERPL-MCNC: 114 MG/DL (ref 0–150)
VLDLC SERPL-MCNC: 21 MG/DL (ref 5–40)

## 2021-08-06 PROCEDURE — 36415 COLL VENOUS BLD VENIPUNCTURE: CPT

## 2021-08-06 PROCEDURE — 80053 COMPREHEN METABOLIC PANEL: CPT

## 2021-08-06 PROCEDURE — G0103 PSA SCREENING: HCPCS

## 2021-08-06 PROCEDURE — 83036 HEMOGLOBIN GLYCOSYLATED A1C: CPT

## 2021-08-06 PROCEDURE — 82570 ASSAY OF URINE CREATININE: CPT

## 2021-08-06 PROCEDURE — 80061 LIPID PANEL: CPT

## 2021-08-06 PROCEDURE — 82043 UR ALBUMIN QUANTITATIVE: CPT

## 2021-08-13 ENCOUNTER — OFFICE VISIT (OUTPATIENT)
Dept: ENDOCRINOLOGY | Facility: CLINIC | Age: 51
End: 2021-08-13

## 2021-08-13 VITALS
DIASTOLIC BLOOD PRESSURE: 75 MMHG | HEIGHT: 70 IN | SYSTOLIC BLOOD PRESSURE: 125 MMHG | TEMPERATURE: 97.5 F | OXYGEN SATURATION: 97 % | HEART RATE: 82 BPM | WEIGHT: 187 LBS | BODY MASS INDEX: 26.77 KG/M2

## 2021-08-13 DIAGNOSIS — E11.65 TYPE 2 DIABETES MELLITUS WITH HYPERGLYCEMIA, WITHOUT LONG-TERM CURRENT USE OF INSULIN (HCC): Primary | ICD-10-CM

## 2021-08-13 DIAGNOSIS — E78.2 MIXED HYPERLIPIDEMIA: ICD-10-CM

## 2021-08-13 LAB — GLUCOSE BLDC GLUCOMTR-MCNC: 121 MG/DL (ref 70–105)

## 2021-08-13 PROCEDURE — 82962 GLUCOSE BLOOD TEST: CPT | Performed by: INTERNAL MEDICINE

## 2021-08-13 PROCEDURE — 99214 OFFICE O/P EST MOD 30 MIN: CPT | Performed by: INTERNAL MEDICINE

## 2021-08-13 NOTE — PROGRESS NOTES
Endocrine Progress Note Outpatient     Patient Care Team:  David Lazo PA-C as PCP - General (Physician Assistant)      Chief Complaint: Follow-up type 2 diabetes:    HPI: 50-year-old male with history of type 2 diabetes, hyperlipidemia and vitamin D deficiency is here for follow-up.    For type 2 diabetes he is currently on Janumet XR 50/1000, 2 tablets daily and Jardiance 25 mg p.o. daily.  He is tolerating his medications well.  Unfortunately has not been checking blood sugars at home and has not been able to follow diet.       Hyperlipidemia: On atorvastatin    Vitamin D deficiency: On replacement.    Past Medical History:   Diagnosis Date   • Frozen shoulder    • Hyperlipidemia    • LEANNA (obstructive sleep apnea)    • Type 2 diabetes mellitus (CMS/[facility])        Social History     Socioeconomic History   • Marital status:      Spouse name: Not on file   • Number of children: Not on file   • Years of education: Not on file   • Highest education level: Not on file   Tobacco Use   • Smoking status: Never Smoker   • Smokeless tobacco: Never Used   Vaping Use   • Vaping Use: Never used   Substance and Sexual Activity   • Alcohol use: No   • Drug use: Never   • Sexual activity: Defer       Family History   Problem Relation Age of Onset   • Lupus Mother    • Hypertension Mother    • Cancer Mother         lung / breast / skin   • Diabetes Father    • Cancer Father         myeloma    • Diabetes Brother        No Known Allergies    ROS:   Constitutional:  Denies fatigue, tiredness.    Eyes:  Denies change in visual acuity   HENT:  Denies nasal congestion or sore throat   Respiratory: denies cough, shortness of breath.   Cardiovascular:  denies chest pain, edema   GI:  Denies abdominal pain, nausea, vomiting.   Musculoskeletal:  Denies back pain or joint pain   Integument:  Denies dry skin and rash   Neurologic:  Denies headache, focal weakness or sensory changes   Endocrine:  Denies polyuria or polydipsia    Psychiatric:  Denies depression or anxiety      Vitals:    08/13/21 1029   BP: 125/75   Pulse: 82   Temp: 97.5 °F (36.4 °C)   SpO2: 97%       Physical Exam:  GEN: NAD, conversant  EYES: EOMI, PERRL, no conjunctival erythema  NECK: no thyromegaly, full ROM   CV: RRR, no murmurs/rubs/gallops, no peripheral edema  LUNG: CTAB, no wheezes/rales/ronchi  SKIN: no rashes, no acanthosis  MSK: no deformities, full ROM of all extremities  NEURO: no tremors, DTR normal  PSYCH: AOX3, appropriate mood, affect normal      Results Review:     I reviewed the patient's new clinical results.    Lab Results   Component Value Date    HGBA1C 7.1 (H) 08/06/2021    HGBA1C 7.0 (H) 02/05/2021    HGBA1C 6.9 (H) 08/07/2020      Lab Results   Component Value Date    GLUCOSE 135 (H) 08/06/2021    BUN 23 (H) 08/06/2021    CREATININE 1.16 08/06/2021    EGFRIFNONA 66 08/06/2021    BCR 19.8 08/06/2021    K 4.4 08/06/2021    CO2 25.2 08/06/2021    CALCIUM 9.3 08/06/2021    ALBUMIN 4.60 08/06/2021    LABIL2 1.6 03/01/2019    AST 14 08/06/2021    ALT 23 08/06/2021    CHOL 121 08/06/2021    TRIG 114 08/06/2021    LDL 62 08/06/2021    HDL 38 (L) 08/06/2021         Medication Review: Reviewed.       Current Outpatient Medications:   •  atorvastatin (LIPITOR) 10 MG tablet, Take 1 tablet by mouth Daily., Disp: 90 tablet, Rfl: 4  •  Cholecalciferol (VITAMIN D3) 125 MCG (5000 UT) capsule capsule, VITAMIN D3 5000 UNIT CAPS, Disp: , Rfl:   •  Cyanocobalamin (Vitamin B-12) 1000 MCG sublingual tablet, PLACE ONE TABLET UNDER THE TONGUE DAILY, Disp: 90 each, Rfl: 1  •  Empagliflozin (Jardiance) 25 MG tablet, Take 25 mg by mouth Daily., Disp: 90 tablet, Rfl: 4  •  glucose blood (ONE TOUCH ULTRA TEST) test strip, ONETOUCH ULTRA BLUE STRP, Disp: , Rfl:   •  SITagliptin-metFORMIN HCl ER (Janumet XR)  MG tablet, Take 1 tablet twice a day., Disp: 180 tablet, Rfl: 4      Assessment/Plan   1.  Diabetes mellitus type 2: Stable with A1c of 7.1%.  We will continue  Janumet and Jardiance.  Please work on your diet and activity recommend annual eye exam and flu vaccine and also continue to work on your diet and activity.    2.  Hyperlipidemia: LDL is excellent at 62 and  triglycerides 114.  Continue atorvastatin for now.    3.  Vitamin D deficiency: On vitamin D supplementation.            Ino Jean MD FACE.    Much of the above report is an electronic transcription/translation of the spoken language to printed text using Dragon Software. As such, the subtleties and finesse of the spoken language may permit erroneous, or at times, nonsensical words or phrases to be inadvertently transcribed; thus changes may be made at a later date to rectify these errors.

## 2021-08-13 NOTE — PATIENT INSTRUCTIONS
Continue current medications  continue to work on your diet and activity  annual eye exam and flu vaccine  labs before follow-up.

## 2021-10-11 ENCOUNTER — OFFICE VISIT (OUTPATIENT)
Dept: FAMILY MEDICINE CLINIC | Facility: CLINIC | Age: 51
End: 2021-10-11

## 2021-10-11 VITALS
SYSTOLIC BLOOD PRESSURE: 141 MMHG | DIASTOLIC BLOOD PRESSURE: 82 MMHG | HEIGHT: 70 IN | WEIGHT: 193.6 LBS | RESPIRATION RATE: 12 BRPM | OXYGEN SATURATION: 97 % | BODY MASS INDEX: 27.72 KG/M2 | HEART RATE: 73 BPM

## 2021-10-11 DIAGNOSIS — M47.812 CERVICAL FACET JOINT SYNDROME: Primary | ICD-10-CM

## 2021-10-11 DIAGNOSIS — M62.838 CERVICAL PARASPINAL MUSCLE SPASM: ICD-10-CM

## 2021-10-11 PROCEDURE — 99213 OFFICE O/P EST LOW 20 MIN: CPT | Performed by: PHYSICIAN ASSISTANT

## 2021-10-11 RX ORDER — DICLOFENAC SODIUM 75 MG/1
75 TABLET, DELAYED RELEASE ORAL 2 TIMES DAILY
Qty: 14 TABLET | Refills: 0 | Status: SHIPPED | OUTPATIENT
Start: 2021-10-11 | End: 2021-10-18

## 2021-10-11 RX ORDER — CYCLOBENZAPRINE HCL 10 MG
10 TABLET ORAL 3 TIMES DAILY PRN
Qty: 30 TABLET | Refills: 0 | Status: SHIPPED | OUTPATIENT
Start: 2021-10-11 | End: 2021-11-10

## 2021-10-11 NOTE — PROGRESS NOTES
"Jhoan Workman is a 51 y.o. male.     Chief Complaint   Patient presents with   • Neck Pain       /82   Pulse 73   Resp 12   Ht 177.8 cm (70\")   Wt 87.8 kg (193 lb 9.6 oz)   SpO2 97%   BMI 27.78 kg/m²     BP Readings from Last 3 Encounters:   10/11/21 141/82   08/13/21 125/75   08/02/21 124/80       Wt Readings from Last 3 Encounters:   10/11/21 87.8 kg (193 lb 9.6 oz)   08/13/21 84.8 kg (187 lb)   08/02/21 87.1 kg (192 lb)       HPI Presents to the clinic with neck pain on the right that radiates up in to the right part of the head behind the ear. No ear pain. Comes and goes. Had started doing exercises recently but has stopped since this started. This prevents rom to the right when it comes on. This usually persists in the late afternoon. Pain doesn't radiate down the arm or into the shoulder.     The following portions of the patient's history were reviewed and updated as appropriate: allergies, current medications, past family history, past medical history, past social history, past surgical history and problem list.    Review of Systems    Objective   Physical Exam  Constitutional:       Appearance: Normal appearance.   Eyes:      Extraocular Movements: Extraocular movements intact.      Pupils: Pupils are equal, round, and reactive to light.   Musculoskeletal:         General: No swelling or tenderness. Normal range of motion.   Neurological:      General: No focal deficit present.      Mental Status: He is alert and oriented to person, place, and time.   Psychiatric:         Mood and Affect: Mood normal.         Behavior: Behavior normal.           Diagnoses and all orders for this visit:    1. Cervical facet joint syndrome (Primary)  Comments:  exercises discussed and diclofenac/flexeril.     2. Cervical paraspinal muscle spasm    Other orders  -     cyclobenzaprine (FLEXERIL) 10 MG tablet; Take 1 tablet by mouth 3 (Three) Times a Day As Needed for Muscle Spasms for up to 30 days.  " Dispense: 30 tablet; Refill: 0  -     diclofenac (VOLTAREN) 75 MG EC tablet; Take 1 tablet by mouth 2 (Two) Times a Day for 7 days.  Dispense: 14 tablet; Refill: 0        Return if symptoms worsen or fail to improve.

## 2022-02-09 ENCOUNTER — LAB (OUTPATIENT)
Dept: LAB | Facility: HOSPITAL | Age: 52
End: 2022-02-09

## 2022-02-09 DIAGNOSIS — E11.65 TYPE 2 DIABETES MELLITUS WITH HYPERGLYCEMIA, WITHOUT LONG-TERM CURRENT USE OF INSULIN: ICD-10-CM

## 2022-02-09 LAB
ALBUMIN SERPL-MCNC: 4.5 G/DL (ref 3.5–5.2)
ALBUMIN UR-MCNC: <1.2 MG/DL
ALBUMIN/GLOB SERPL: 1.7 G/DL
ALP SERPL-CCNC: 67 U/L (ref 39–117)
ALT SERPL W P-5'-P-CCNC: 25 U/L (ref 1–41)
ANION GAP SERPL CALCULATED.3IONS-SCNC: 9.4 MMOL/L (ref 5–15)
AST SERPL-CCNC: 17 U/L (ref 1–40)
BILIRUB SERPL-MCNC: 0.6 MG/DL (ref 0–1.2)
BUN SERPL-MCNC: 21 MG/DL (ref 6–20)
BUN/CREAT SERPL: 18.4 (ref 7–25)
CALCIUM SPEC-SCNC: 9.5 MG/DL (ref 8.6–10.5)
CHLORIDE SERPL-SCNC: 102 MMOL/L (ref 98–107)
CHOLEST SERPL-MCNC: 118 MG/DL (ref 0–200)
CO2 SERPL-SCNC: 26.6 MMOL/L (ref 22–29)
CREAT SERPL-MCNC: 1.14 MG/DL (ref 0.76–1.27)
CREAT UR-MCNC: 76.6 MG/DL
GFR SERPL CREATININE-BSD FRML MDRD: 67 ML/MIN/1.73
GLOBULIN UR ELPH-MCNC: 2.7 GM/DL
GLUCOSE SERPL-MCNC: 150 MG/DL (ref 65–99)
HBA1C MFR BLD: 7.3 % (ref 3.5–5.6)
HDLC SERPL-MCNC: 39 MG/DL (ref 40–60)
LDLC SERPL CALC-MCNC: 50 MG/DL (ref 0–100)
LDLC/HDLC SERPL: 1.12 {RATIO}
MICROALBUMIN/CREAT UR: NORMAL MG/G{CREAT}
POTASSIUM SERPL-SCNC: 4.1 MMOL/L (ref 3.5–5.2)
PROT SERPL-MCNC: 7.2 G/DL (ref 6–8.5)
SODIUM SERPL-SCNC: 138 MMOL/L (ref 136–145)
TRIGL SERPL-MCNC: 177 MG/DL (ref 0–150)
VLDLC SERPL-MCNC: 29 MG/DL (ref 5–40)

## 2022-02-09 PROCEDURE — 82570 ASSAY OF URINE CREATININE: CPT

## 2022-02-09 PROCEDURE — 36415 COLL VENOUS BLD VENIPUNCTURE: CPT

## 2022-02-09 PROCEDURE — 80053 COMPREHEN METABOLIC PANEL: CPT

## 2022-02-09 PROCEDURE — 82043 UR ALBUMIN QUANTITATIVE: CPT

## 2022-02-09 PROCEDURE — 83036 HEMOGLOBIN GLYCOSYLATED A1C: CPT

## 2022-02-09 PROCEDURE — 80061 LIPID PANEL: CPT

## 2022-02-15 ENCOUNTER — OFFICE VISIT (OUTPATIENT)
Dept: ENDOCRINOLOGY | Facility: CLINIC | Age: 52
End: 2022-02-15

## 2022-02-15 VITALS
BODY MASS INDEX: 26.71 KG/M2 | SYSTOLIC BLOOD PRESSURE: 118 MMHG | HEIGHT: 70 IN | HEART RATE: 80 BPM | DIASTOLIC BLOOD PRESSURE: 78 MMHG | TEMPERATURE: 96.9 F | WEIGHT: 186.6 LBS

## 2022-02-15 DIAGNOSIS — E78.2 MIXED HYPERLIPIDEMIA: ICD-10-CM

## 2022-02-15 DIAGNOSIS — E11.65 TYPE 2 DIABETES MELLITUS WITH HYPERGLYCEMIA, WITHOUT LONG-TERM CURRENT USE OF INSULIN: Primary | ICD-10-CM

## 2022-02-15 DIAGNOSIS — E55.9 VITAMIN D DEFICIENCY: ICD-10-CM

## 2022-02-15 LAB — GLUCOSE BLDC GLUCOMTR-MCNC: 163 MG/DL (ref 70–105)

## 2022-02-15 PROCEDURE — 82962 GLUCOSE BLOOD TEST: CPT | Performed by: INTERNAL MEDICINE

## 2022-02-15 PROCEDURE — 99214 OFFICE O/P EST MOD 30 MIN: CPT | Performed by: INTERNAL MEDICINE

## 2022-02-15 RX ORDER — ATORVASTATIN CALCIUM 10 MG/1
10 TABLET, FILM COATED ORAL DAILY
Qty: 90 TABLET | Refills: 4 | Status: SHIPPED | OUTPATIENT
Start: 2022-02-15

## 2022-02-15 RX ORDER — SITAGLIPTIN AND METFORMIN HYDROCHLORIDE 1000; 50 MG/1; MG/1
TABLET, FILM COATED, EXTENDED RELEASE ORAL
Qty: 180 TABLET | Refills: 4 | Status: SHIPPED | OUTPATIENT
Start: 2022-02-15 | End: 2023-03-21

## 2022-02-15 NOTE — PROGRESS NOTES
Endocrine Progress Note Outpatient     Patient Care Team:  David Lazo PA-C as PCP - General (Physician Assistant)    Chief Complaint: Follow-up type 2 diabetes:    HPI: 51-year-old male with history of type 2 diabetes, hyperlipidemia and vitamin D deficiency is here for follow-up.    For type 2 diabetes he is currently on Janumet XR 50/1000, 2 tablets daily and Jardiance 25 mg p.o. daily.  He is tolerating his medications well.  Unfortunately has not been checking blood sugars at home and has not been able to follow diet.       Hyperlipidemia: On atorvastatin    Vitamin D deficiency: On replacement.    Past Medical History:   Diagnosis Date   • Frozen shoulder    • Hyperlipidemia    • LEANNA (obstructive sleep apnea)    • Type 2 diabetes mellitus (HCC)        Social History     Socioeconomic History   • Marital status:    Tobacco Use   • Smoking status: Never Smoker   • Smokeless tobacco: Never Used   Vaping Use   • Vaping Use: Never used   Substance and Sexual Activity   • Alcohol use: No   • Drug use: Never   • Sexual activity: Defer       Family History   Problem Relation Age of Onset   • Lupus Mother    • Hypertension Mother    • Cancer Mother         lung / breast / skin   • Diabetes Father    • Cancer Father         myeloma    • Diabetes Brother        No Known Allergies    ROS:   Constitutional:  Denies fatigue, tiredness.    Eyes:  Denies change in visual acuity   HENT:  Denies nasal congestion or sore throat   Respiratory: denies cough, shortness of breath.   Cardiovascular:  denies chest pain, edema   GI:  Denies abdominal pain, nausea, vomiting.   Musculoskeletal:  Denies back pain or joint pain   Integument:  Denies dry skin and rash   Neurologic:  Denies headache, focal weakness or sensory changes   Endocrine:  Denies polyuria or polydipsia   Psychiatric:  Denies depression or anxiety      Vitals:    02/15/22 0926   BP: 118/78   Pulse: 80   Temp: 96.9 °F (36.1 °C)       Physical Exam:  GEN:  NAD, conversant  EYES: EOMI, PERRL, no conjunctival erythema  NECK: no thyromegaly, full ROM   CV: RRR, no murmurs/rubs/gallops, no peripheral edema  LUNG: CTAB, no wheezes/rales/ronchi  SKIN: no rashes, no acanthosis  MSK: no deformities, full ROM of all extremities  NEURO: no tremors, DTR normal  PSYCH: AOX3, appropriate mood, affect normal  FEET: No Ulcer or calluses. Monofilament intact. Good DP pulses on both sides.     Results Review:     I reviewed the patient's new clinical results.    Lab Results   Component Value Date    HGBA1C 7.3 (H) 02/09/2022    HGBA1C 7.1 (H) 08/06/2021    HGBA1C 7.0 (H) 02/05/2021      Lab Results   Component Value Date    GLUCOSE 150 (H) 02/09/2022    BUN 21 (H) 02/09/2022    CREATININE 1.14 02/09/2022    EGFRIFNONA 67 02/09/2022    BCR 18.4 02/09/2022    K 4.1 02/09/2022    CO2 26.6 02/09/2022    CALCIUM 9.5 02/09/2022    ALBUMIN 4.50 02/09/2022    LABIL2 1.6 03/01/2019    AST 17 02/09/2022    ALT 25 02/09/2022    CHOL 118 02/09/2022    TRIG 177 (H) 02/09/2022    LDL 50 02/09/2022    HDL 39 (L) 02/09/2022         Medication Review: Reviewed.       Current Outpatient Medications:   •  atorvastatin (LIPITOR) 10 MG tablet, Take 1 tablet by mouth Daily., Disp: 90 tablet, Rfl: 4  •  Cholecalciferol (VITAMIN D3) 125 MCG (5000 UT) capsule capsule, VITAMIN D3 5000 UNIT CAPS, Disp: , Rfl:   •  Cyanocobalamin (Vitamin B-12) 1000 MCG sublingual tablet, PLACE ONE TABLET UNDER THE TONGUE DAILY, Disp: 90 each, Rfl: 1  •  Empagliflozin (Jardiance) 25 MG tablet, Take 25 mg by mouth Daily., Disp: 90 tablet, Rfl: 4  •  glucose blood (ONE TOUCH ULTRA TEST) test strip, ONETOUCH ULTRA BLUE STRP, Disp: , Rfl:   •  SITagliptin-metFORMIN HCl ER (Janumet XR)  MG tablet, Take 1 tablet twice a day., Disp: 180 tablet, Rfl: 4      Assessment/Plan   1.  Diabetes mellitus type 2: Uncontrolled with mild high A1c of 7.3%.  He has not been physically active due to Covid but he is going to work on his diet  now and activity, spring.  At this time we will continue Janumet and Jardiance.  We will follow blood sugars and A1c.  Recommend annual eye exam.    2.  Hyperlipidemia: Well-controlled, continue atorvastatin.    3.  Vitamin D deficiency: On vitamin D supplementation.  We will continue that.            Ino Jean MD FACE.    Much of the above report is an electronic transcription/translation of the spoken language to printed text using Dragon Software. As such, the subtleties and finesse of the spoken language may permit erroneous, or at times, nonsensical words or phrases to be inadvertently transcribed; thus changes may be made at a later date to rectify these errors.

## 2022-02-15 NOTE — PATIENT INSTRUCTIONS
Please continue current medication  Always keep glucose source in case of low blood sugar  Annual eye exam and flu vaccine  Labs before follow-up.

## 2022-09-01 ENCOUNTER — OFFICE VISIT (OUTPATIENT)
Dept: FAMILY MEDICINE CLINIC | Facility: CLINIC | Age: 52
End: 2022-09-01

## 2022-09-01 VITALS
SYSTOLIC BLOOD PRESSURE: 132 MMHG | BODY MASS INDEX: 25.45 KG/M2 | HEIGHT: 70 IN | HEART RATE: 77 BPM | DIASTOLIC BLOOD PRESSURE: 84 MMHG | TEMPERATURE: 96.4 F | OXYGEN SATURATION: 99 % | RESPIRATION RATE: 16 BRPM | WEIGHT: 177.8 LBS

## 2022-09-01 DIAGNOSIS — Z00.00 ANNUAL PHYSICAL EXAM: Primary | ICD-10-CM

## 2022-09-01 DIAGNOSIS — M54.12 CERVICAL RADICULOPATHY: ICD-10-CM

## 2022-09-01 DIAGNOSIS — Z23 NEED FOR PNEUMOCOCCAL VACCINE: ICD-10-CM

## 2022-09-01 DIAGNOSIS — Z12.11 COLON CANCER SCREENING: ICD-10-CM

## 2022-09-01 DIAGNOSIS — G47.33 OSA (OBSTRUCTIVE SLEEP APNEA): ICD-10-CM

## 2022-09-01 DIAGNOSIS — Z12.5 SCREENING PSA (PROSTATE SPECIFIC ANTIGEN): ICD-10-CM

## 2022-09-01 PROCEDURE — 99396 PREV VISIT EST AGE 40-64: CPT | Performed by: PHYSICIAN ASSISTANT

## 2022-09-01 NOTE — PROGRESS NOTES
"Subjective   Alexx JOSE ARMANDO Workman is a 52 y.o. male.     Chief Complaint   Patient presents with   • Annual Exam       /84 (BP Location: Right arm, Patient Position: Sitting, Cuff Size: Adult)   Pulse 77   Temp 96.4 °F (35.8 °C) (Infrared)   Resp 16   Ht 177.8 cm (70\")   Wt 80.6 kg (177 lb 12.8 oz)   SpO2 99%   BMI 25.51 kg/m²     BP Readings from Last 3 Encounters:   09/01/22 132/84   02/15/22 118/78   10/11/21 141/82       Wt Readings from Last 3 Encounters:   09/01/22 80.6 kg (177 lb 12.8 oz)   02/15/22 84.6 kg (186 lb 9.6 oz)   10/11/21 87.8 kg (193 lb 9.6 oz)       HPI Presents  To the clinic for annual physical exam. He has been doing pretty well. He has been having neck pain that does radiate into the arm. He has had no specific injury. He is due for psa screen. Has other labs already done per endocrinology and continues to follow with them for his diabetes. He has an appointment with endo on 9/26/22. He is due for colon cancer screening. He is on cpap and does well on machine but needs updated machine.     The following portions of the patient's history were reviewed and updated as appropriate: allergies, current medications, past family history, past medical history, past social history, past surgical history and problem list.    Review of Systems    Objective   Physical Exam  Constitutional:       Appearance: He is well-developed.   HENT:      Head: Normocephalic and atraumatic.      Right Ear: Tympanic membrane normal.      Left Ear: Tympanic membrane normal.      Nose: No congestion.      Mouth/Throat:      Pharynx: No oropharyngeal exudate.   Eyes:      Conjunctiva/sclera: Conjunctivae normal.      Pupils: Pupils are equal, round, and reactive to light.   Cardiovascular:      Rate and Rhythm: Normal rate and regular rhythm.      Heart sounds: No murmur heard.  Pulmonary:      Effort: Pulmonary effort is normal.      Breath sounds: Normal breath sounds.   Abdominal:      General: Bowel sounds are " normal.      Palpations: Abdomen is soft.      Tenderness: There is no abdominal tenderness.   Musculoskeletal:         General: Tenderness (cervical spine and paraspinal muscles) present. No deformity. Normal range of motion.      Cervical back: Normal range of motion and neck supple.   Lymphadenopathy:      Cervical: No cervical adenopathy.   Skin:     General: Skin is warm and dry.      Findings: No rash.   Neurological:      Mental Status: He is alert and oriented to person, place, and time.      Cranial Nerves: No cranial nerve deficit.   Psychiatric:         Behavior: Behavior normal.         Thought Content: Thought content normal.         Judgment: Judgment normal.           Diagnoses and all orders for this visit:    1. Annual physical exam (Primary)    2. LEANNA (obstructive sleep apnea)  Comments:  needs new machine- does well on cpap therapy for leanna.     3. Cervical radiculopathy  -     MRI Cervical Spine Without Contrast; Future    4. Colon cancer screening  -     Ambulatory Referral For Screening Colonoscopy    5. Screening PSA (prostate specific antigen)  -     PSA SCREENING; Future        No follow-ups on file.

## 2022-09-02 ENCOUNTER — TELEPHONE (OUTPATIENT)
Dept: FAMILY MEDICINE CLINIC | Facility: CLINIC | Age: 52
End: 2022-09-02

## 2022-09-02 NOTE — TELEPHONE ENCOUNTER
Caller: Alexx Workman    Relationship: Self    Best call back number: [phone]    What form or medical record are you requesting: CHART NOTES FROM 9/1   (DOES WELL ON CPAP BUT NEEDS A NEWER MACHINE)    Who is requesting this form or medical record from you: DARLYN    How would you like to receive the form or medical records (pick-up, mail, fax): FAX  [phone]

## 2022-09-14 DIAGNOSIS — M54.12 CERVICAL RADICULOPATHY: ICD-10-CM

## 2022-09-15 ENCOUNTER — TELEPHONE (OUTPATIENT)
Dept: FAMILY MEDICINE CLINIC | Facility: CLINIC | Age: 52
End: 2022-09-15

## 2022-09-15 NOTE — TELEPHONE ENCOUNTER
Caller: Alexx Workman    Relationship: Self    Best call back number: [phone]    What test was performed: MRI    When was the test performed: 9/13/2022    Where was the test performed: [facility]

## 2022-09-17 DIAGNOSIS — M54.12 CERVICAL RADICULOPATHY: Primary | ICD-10-CM

## 2022-09-17 NOTE — TELEPHONE ENCOUNTER
Disc bulge at c6-c7 area. We will have him see neurosurgery but may end up needing injection from pain management

## 2022-09-19 ENCOUNTER — TELEPHONE (OUTPATIENT)
Dept: FAMILY MEDICINE CLINIC | Facility: CLINIC | Age: 52
End: 2022-09-19

## 2022-09-19 ENCOUNTER — LAB (OUTPATIENT)
Dept: LAB | Facility: HOSPITAL | Age: 52
End: 2022-09-19

## 2022-09-19 DIAGNOSIS — Z12.5 SCREENING PSA (PROSTATE SPECIFIC ANTIGEN): ICD-10-CM

## 2022-09-19 DIAGNOSIS — E11.65 TYPE 2 DIABETES MELLITUS WITH HYPERGLYCEMIA, WITHOUT LONG-TERM CURRENT USE OF INSULIN: ICD-10-CM

## 2022-09-19 LAB
ALBUMIN SERPL-MCNC: 4.7 G/DL (ref 3.5–5.2)
ALBUMIN UR-MCNC: <1.2 MG/DL
ALBUMIN/GLOB SERPL: 2.1 G/DL
ALP SERPL-CCNC: 61 U/L (ref 39–117)
ALT SERPL W P-5'-P-CCNC: 18 U/L (ref 1–41)
ANION GAP SERPL CALCULATED.3IONS-SCNC: 10.7 MMOL/L (ref 5–15)
AST SERPL-CCNC: 16 U/L (ref 1–40)
BILIRUB SERPL-MCNC: 0.5 MG/DL (ref 0–1.2)
BUN SERPL-MCNC: 20 MG/DL (ref 6–20)
BUN/CREAT SERPL: 16.9 (ref 7–25)
CALCIUM SPEC-SCNC: 9.5 MG/DL (ref 8.6–10.5)
CHLORIDE SERPL-SCNC: 101 MMOL/L (ref 98–107)
CHOLEST SERPL-MCNC: 104 MG/DL (ref 0–200)
CO2 SERPL-SCNC: 28.3 MMOL/L (ref 22–29)
CREAT SERPL-MCNC: 1.18 MG/DL (ref 0.76–1.27)
CREAT UR-MCNC: 100.8 MG/DL
EGFRCR SERPLBLD CKD-EPI 2021: 73.8 ML/MIN/1.73
GLOBULIN UR ELPH-MCNC: 2.2 GM/DL
GLUCOSE SERPL-MCNC: 131 MG/DL (ref 65–99)
HBA1C MFR BLD: 6.2 % (ref 3.5–5.6)
HDLC SERPL-MCNC: 42 MG/DL (ref 40–60)
LDLC SERPL CALC-MCNC: 38 MG/DL (ref 0–100)
LDLC/HDLC SERPL: 0.82 {RATIO}
MICROALBUMIN/CREAT UR: NORMAL MG/G{CREAT}
POTASSIUM SERPL-SCNC: 4.7 MMOL/L (ref 3.5–5.2)
PROT SERPL-MCNC: 6.9 G/DL (ref 6–8.5)
PSA SERPL-MCNC: 0.49 NG/ML (ref 0–4)
SODIUM SERPL-SCNC: 140 MMOL/L (ref 136–145)
TRIGL SERPL-MCNC: 138 MG/DL (ref 0–150)
VLDLC SERPL-MCNC: 24 MG/DL (ref 5–40)

## 2022-09-19 PROCEDURE — 36415 COLL VENOUS BLD VENIPUNCTURE: CPT

## 2022-09-19 PROCEDURE — 80053 COMPREHEN METABOLIC PANEL: CPT

## 2022-09-19 PROCEDURE — 83036 HEMOGLOBIN GLYCOSYLATED A1C: CPT

## 2022-09-19 PROCEDURE — 80061 LIPID PANEL: CPT

## 2022-09-19 PROCEDURE — 82043 UR ALBUMIN QUANTITATIVE: CPT

## 2022-09-19 PROCEDURE — G0103 PSA SCREENING: HCPCS

## 2022-09-19 PROCEDURE — 82570 ASSAY OF URINE CREATININE: CPT

## 2022-09-19 NOTE — TELEPHONE ENCOUNTER
Angelo with patient's insurance company called to let you know that patient's MRI was denied on 9/2/2022 because it was not medically necessary.  Said patient would need to complete 6 weeks of physical therapy.  Said you would have to file an appeal and then notify patient to keep him updated.  I asked what the MRI was for and he wasn't sure.

## 2022-09-20 NOTE — TELEPHONE ENCOUNTER
I will try to get this done. Is there any phone numbers or anything that I can call to discuss this with them?

## 2022-09-26 ENCOUNTER — OFFICE VISIT (OUTPATIENT)
Dept: ENDOCRINOLOGY | Facility: CLINIC | Age: 52
End: 2022-09-26

## 2022-09-26 VITALS
HEIGHT: 70 IN | OXYGEN SATURATION: 98 % | SYSTOLIC BLOOD PRESSURE: 122 MMHG | DIASTOLIC BLOOD PRESSURE: 72 MMHG | WEIGHT: 175 LBS | HEART RATE: 79 BPM | BODY MASS INDEX: 25.05 KG/M2

## 2022-09-26 DIAGNOSIS — E78.2 MIXED HYPERLIPIDEMIA: ICD-10-CM

## 2022-09-26 DIAGNOSIS — E55.9 VITAMIN D DEFICIENCY: ICD-10-CM

## 2022-09-26 DIAGNOSIS — E11.65 TYPE 2 DIABETES MELLITUS WITH HYPERGLYCEMIA, WITHOUT LONG-TERM CURRENT USE OF INSULIN: Primary | ICD-10-CM

## 2022-09-26 LAB — GLUCOSE BLDC GLUCOMTR-MCNC: 148 MG/DL (ref 70–105)

## 2022-09-26 PROCEDURE — 99214 OFFICE O/P EST MOD 30 MIN: CPT | Performed by: INTERNAL MEDICINE

## 2022-09-26 PROCEDURE — 82962 GLUCOSE BLOOD TEST: CPT | Performed by: INTERNAL MEDICINE

## 2022-09-26 NOTE — PATIENT INSTRUCTIONS
Continue current management  Continue to work on your diet and activity  Annual eye exam  Labs before follow-up.

## 2022-09-26 NOTE — PROGRESS NOTES
Neurosurgical Consultation      Alexx JOSE ARMANDO Workman is a 52 y.o. male is being seen for consultation today at the request of David Lazo PA-C. Patient complains of pain on right side of neck traveling upward around top of ear.    Chief Complaint   Patient presents with   • Neck Pain        Previous treatment:    HPI: This is a 52-year-old gentleman with a history of diabetes who presents to the neurosurgery clinic with intermittent neck pain.  His neck pain is predominantly on the right side and is occasionally associated with pain into the back of his head.  His pain is worsened with walking extended distances and driving.  He does not have any pain rating into his arms.  He does not have any subjective myelopathy.  He has utilized muscle relaxers without profound improvement.  He has not had any formal physical therapy.  He was provided with home stretching exercises and initially did get some benefit from this.    Past Medical History:   Diagnosis Date   • Frozen shoulder    • Hyperlipidemia    • LEANNA (obstructive sleep apnea)    • Type 2 diabetes mellitus (HCC)         Past Surgical History:   Procedure Laterality Date   • NOSE SURGERY      laser nose   • TOE NAIL AMPUTATION     • TONSILLECTOMY          Current Outpatient Medications on File Prior to Visit   Medication Sig Dispense Refill   • atorvastatin (LIPITOR) 10 MG tablet Take 1 tablet by mouth Daily. 90 tablet 4   • Cyanocobalamin (Vitamin B-12) 1000 MCG sublingual tablet PLACE ONE TABLET UNDER THE TONGUE DAILY 90 each 1   • empagliflozin (Jardiance) 25 MG tablet tablet Take 1 tablet by mouth Daily. 90 tablet 4   • glucose blood test strip ONETOUCH ULTRA BLUE STRP     • SITagliptin-metFORMIN HCl ER (Janumet XR)  MG tablet Take 1 tablet twice a day. 180 tablet 4   • vitamin D3 125 MCG (5000 UT) capsule capsule Take 1 capsule by mouth Daily. 100 capsule 4     No current facility-administered medications on file prior to visit.        No Known Allergies  "    Social History     Socioeconomic History   • Marital status:      Spouse name: Sailaja   • Number of children: 2   • Years of education: 23   Tobacco Use   • Smoking status: Never Smoker   • Smokeless tobacco: Never Used   Vaping Use   • Vaping Use: Never used   Substance and Sexual Activity   • Alcohol use: No   • Drug use: Never   • Sexual activity: Defer          Review of Systems   Constitutional: Positive for activity change.   HENT: Positive for ear pain (on right side last year).    Eyes: Negative.    Respiratory: Negative.    Cardiovascular: Negative.    Gastrointestinal: Negative.    Endocrine: Negative.    Genitourinary: Negative.    Musculoskeletal: Positive for neck pain and neck stiffness (limited range of motion).   Skin: Negative.    Allergic/Immunologic: Negative.    Neurological: Positive for headache.   Hematological: Negative.    Psychiatric/Behavioral: Positive for sleep disturbance.        Physical Examination:     Vitals:    09/27/22 1009   BP: 143/87   Pulse: 74   SpO2: 100%   Weight: 80.9 kg (178 lb 6.4 oz)   Height: 177.8 cm (70\")        Physical Exam  Eyes:      General: Lids are normal.      Extraocular Movements: Extraocular movements intact.      Pupils: Pupils are equal, round, and reactive to light.   Neurological:      Deep Tendon Reflexes: Strength normal.      Reflex Scores:       Bicep reflexes are 0 on the right side and 0 on the left side.       Brachioradialis reflexes are 0 on the right side and 0 on the left side.  Psychiatric:         Speech: Speech normal.          Neurological Exam  Mental Status  Awake, alert and oriented to person, place and time. Speech is normal. Language is fluent with no aphasia. Attention and concentration are normal.    Cranial Nerves  CN II: Visual acuity is normal. Visual fields full to confrontation.  CN III, IV, VI: Extraocular movements intact bilaterally. Normal lids and orbits bilaterally. Pupils equal round and reactive to light " bilaterally.  CN V: Facial sensation is normal.  CN VII: Full and symmetric facial movement.  CN IX, X: Palate elevates symmetrically. Normal gag reflex.  CN XI: Shoulder shrug strength is normal.  CN XII: Tongue midline without atrophy or fasciculations.    Motor  Normal muscle bulk throughout. No fasciculations present. Normal muscle tone. Strength is 5/5 throughout all four extremities.    Sensory  Light touch is normal in upper and lower extremities.     Reflexes                                            Right                      Left  Brachioradialis                    0                         0  Biceps                                 0                         0    Right pathological reflexes: Kingsley's absent.  Left pathological reflexes: Kingsley's absent.    Coordination  Right: Finger-to-nose normal.Left: Finger-to-nose normal.    Gait  Casual gait is normal including stance, stride, and arm swing. Able to rise from chair without using arms.       Result Review  The following data was reviewed by: Les Davey MD on 09/27/2022:    Data reviewed: Radiologic studies MRI of the cervical spine shows a small centrally oriented C6/C7 herniated disc without central or neuroforaminal stenosis.  There is no significant central stenosis throughout the entirety of the cervical spine.  There is some indication of spondylosis.     Assessment/plan:  This is a 52-year-old gentleman with activity induced neck pain that occasionally radiates up into the back of his head.  There is no associated radiculopathy or myelopathy.  His MRI does not provide any indication of spinal cord or nerve root compression.  I recommend a course of physical therapy.  He may follow-up with me in an as-needed basis.    Diagnoses and all orders for this visit:    1. Neck pain (Primary)  -     Ambulatory Referral to Physical Therapy Evaluate and treat         Return if symptoms worsen or fail to improve.            Les Davey MD

## 2022-09-26 NOTE — PROGRESS NOTES
Endocrine Progress Note Outpatient     Patient Care Team:  David Lazo PA-C as PCP - General (Physician Assistant)    Chief Complaint: Follow-up type 2 diabetes:    HPI: 51-year-old male with history of type 2 diabetes, hyperlipidemia and vitamin D deficiency is here for follow-up.    For type 2 diabetes he is currently on Janumet XR 50/1000, 2 tablets daily and Jardiance 25 mg p.o. daily.  He is tolerating his medications well.  Unfortunately has not been checking blood sugars at home and has not been able to follow diet.       Hyperlipidemia: On atorvastatin    Vitamin D deficiency: On replacement.    Past Medical History:   Diagnosis Date   • Frozen shoulder    • Hyperlipidemia    • LEANNA (obstructive sleep apnea)    • Type 2 diabetes mellitus (HCC)        Social History     Socioeconomic History   • Marital status:      Spouse name: Sailaja   • Number of children: 2   • Years of education: 23   Tobacco Use   • Smoking status: Never Smoker   • Smokeless tobacco: Never Used   Vaping Use   • Vaping Use: Never used   Substance and Sexual Activity   • Alcohol use: No   • Drug use: Never   • Sexual activity: Defer       Family History   Problem Relation Age of Onset   • Lupus Mother    • Hypertension Mother    • Cancer Mother         lung / breast / skin   • Diabetes Father    • Cancer Father         myeloma    • Diabetes Brother        No Known Allergies    ROS:   Constitutional:  Denies fatigue, tiredness.    Eyes:  Denies change in visual acuity   HENT:  Denies nasal congestion or sore throat   Respiratory: denies cough, shortness of breath.   Cardiovascular:  denies chest pain, edema   GI:  Denies abdominal pain, nausea, vomiting.   Musculoskeletal:  Denies back pain or joint pain   Integument:  Denies dry skin and rash   Neurologic:  Denies headache, focal weakness or sensory changes   Endocrine:  Denies polyuria or polydipsia   Psychiatric:  Denies depression or anxiety      Vitals:    09/26/22 1243    BP: 122/72   Pulse: 79   SpO2: 98%       Physical Exam:  GEN: NAD, conversant  EYES: EOMI, PERRL, no conjunctival erythema  NECK: no thyromegaly, full ROM   CV: RRR, no murmurs/rubs/gallops, no peripheral edema  LUNG: CTAB, no wheezes/rales/ronchi  SKIN: no rashes, no acanthosis  MSK: no deformities, full ROM of all extremities  NEURO: no tremors, DTR normal  PSYCH: AOX3, appropriate mood, affect normal      Results Review:     I reviewed the patient's new clinical results.    Lab Results   Component Value Date    HGBA1C 6.2 (H) 09/19/2022    HGBA1C 7.3 (H) 02/09/2022    HGBA1C 7.1 (H) 08/06/2021      Lab Results   Component Value Date    GLUCOSE 131 (H) 09/19/2022    BUN 20 09/19/2022    CREATININE 1.18 09/19/2022    EGFRIFNONA 67 02/09/2022    BCR 16.9 09/19/2022    K 4.7 09/19/2022    CO2 28.3 09/19/2022    CALCIUM 9.5 09/19/2022    ALBUMIN 4.70 09/19/2022    LABIL2 1.6 03/01/2019    AST 16 09/19/2022    ALT 18 09/19/2022    CHOL 104 09/19/2022    TRIG 138 09/19/2022    LDL 38 09/19/2022    HDL 42 09/19/2022         Medication Review: Reviewed.       Current Outpatient Medications:   •  atorvastatin (LIPITOR) 10 MG tablet, Take 1 tablet by mouth Daily., Disp: 90 tablet, Rfl: 4  •  Cyanocobalamin (Vitamin B-12) 1000 MCG sublingual tablet, PLACE ONE TABLET UNDER THE TONGUE DAILY, Disp: 90 each, Rfl: 1  •  empagliflozin (Jardiance) 25 MG tablet tablet, Take 1 tablet by mouth Daily., Disp: 90 tablet, Rfl: 4  •  glucose blood test strip, ONETOUCH ULTRA BLUE STRP, Disp: , Rfl:   •  SITagliptin-metFORMIN HCl ER (Janumet XR)  MG tablet, Take 1 tablet twice a day., Disp: 180 tablet, Rfl: 4  •  vitamin D3 125 MCG (5000 UT) capsule capsule, Take 1 capsule by mouth Daily., Disp: 100 capsule, Rfl: 4      Assessment & Plan   1.  Diabetes mellitus type 2 with Hyperglycemia: Well-controlled, A1c at 6.2%.  At this time we will continue Janumet and Jardiance.  We will follow blood sugars and A1c.  Recommend annual eye  exam.    2.  Hyperlipidemia: Well-controlled, continue atorvastatin.    3.  Vitamin D deficiency: On vitamin D supplementation.  We will continue that.    Assessment and plan from February 15, 2022 reviewed and updated.            Ino Jean MD FACE.    Much of the above report is an electronic transcription/translation of the spoken language to printed text using Dragon Software. As such, the subtleties and finesse of the spoken language may permit erroneous, or at times, nonsensical words or phrases to be inadvertently transcribed; thus changes may be made at a later date to rectify these errors.

## 2022-09-27 ENCOUNTER — OFFICE VISIT (OUTPATIENT)
Dept: NEUROSURGERY | Facility: CLINIC | Age: 52
End: 2022-09-27

## 2022-09-27 VITALS
HEIGHT: 70 IN | DIASTOLIC BLOOD PRESSURE: 87 MMHG | HEART RATE: 74 BPM | SYSTOLIC BLOOD PRESSURE: 143 MMHG | WEIGHT: 178.4 LBS | BODY MASS INDEX: 25.54 KG/M2 | OXYGEN SATURATION: 100 %

## 2022-09-27 DIAGNOSIS — M54.2 NECK PAIN: Primary | ICD-10-CM

## 2022-09-27 PROCEDURE — 99203 OFFICE O/P NEW LOW 30 MIN: CPT | Performed by: NEUROLOGICAL SURGERY

## 2022-09-27 NOTE — TELEPHONE ENCOUNTER
Spoke to Kirk CARDENAS at [facility] call number is [phone] was given the fax number to fax appeal to [facility].[phone]. Appeal was faxed .

## 2022-09-27 NOTE — TELEPHONE ENCOUNTER
Attempted to call and could not get through as I do not have the information that Alexia would have. I wrote a letter for appeal and its in my basket.

## 2022-09-30 ENCOUNTER — PATIENT ROUNDING (BHMG ONLY) (OUTPATIENT)
Dept: NEUROSURGERY | Facility: CLINIC | Age: 52
End: 2022-09-30

## 2022-10-12 ENCOUNTER — TREATMENT (OUTPATIENT)
Dept: PHYSICAL THERAPY | Facility: CLINIC | Age: 52
End: 2022-10-12

## 2022-10-12 DIAGNOSIS — M54.2 PAIN, NECK: Primary | ICD-10-CM

## 2022-10-12 PROCEDURE — 97112 NEUROMUSCULAR REEDUCATION: CPT | Performed by: PHYSICAL THERAPIST

## 2022-10-12 PROCEDURE — 97161 PT EVAL LOW COMPLEX 20 MIN: CPT | Performed by: PHYSICAL THERAPIST

## 2022-10-12 PROCEDURE — 97140 MANUAL THERAPY 1/> REGIONS: CPT | Performed by: PHYSICAL THERAPIST

## 2022-10-12 PROCEDURE — 97110 THERAPEUTIC EXERCISES: CPT | Performed by: PHYSICAL THERAPIST

## 2022-10-12 NOTE — PROGRESS NOTES
Physical Therapy Initial Evaluation and Plan of Care    Patient: Alexx Workman   : 1970  Diagnosis/ICD-10 Code:  Pain, neck [M54.2]  Referring practitioner: Les Davey MD  Date of Initial Visit: 10/12/2022  Today's Date: 10/12/2022  Patient seen for 1 sessions           Visit Diagnoses:    ICD-10-CM ICD-9-CM   1. Pain, neck  M54.2 723.1       Subjective Questionnaire: NDI:18%      Subjective 51 yo male with c/o cspine pain. Pt with insidious onset of pain ~1 year ago after prolonged walking. Having episodes of pain a few times per week but this is dependent on his activities. Primary pain is along the R lateral cspine, R suboccipital area, and into his head. Pt feels symptoms were exacerbated after doing some weight lifting. Denies UE symptoms. Symptoms worsen with driving, lifting, walking, deskwork. Symptoms improve with rest. Further exacerbating and alleviating factors are unknown. 0/10 pain now and unknown at worst.   MD follow up: prn  Social hx: Works as a       Objective          Palpation     Right   Hypertonic in the suboccipitals. Tenderness of the suboccipitals.     Active Range of Motion     Additional Active Range of Motion Details  Upper cervical rotation to right is 40 deg, L is wnl    UEs wfl.    Strength/Myotome Testing   Cervical Spine     Left   Normal strength    Right   Normal strength      Limited upper cervical mobility on the right.    Assessment & Plan     Assessment  Impairments: abnormal or restricted ROM, impaired physical strength, lacks appropriate home exercise program and pain with function  Functional Limitations: carrying objects, lifting, pulling, pushing, standing, reaching behind back, reaching overhead and unable to perform repetitive tasks  Assessment details: 51 yo male with c/o cspine pain. Pt is with a good response to treatment this date and would benefit from further evaluation and treatment to address the above impairments.    Prognosis:  good    Goals  Plan Goals: Short term goals, 1 week: Tolerate HEP progression.  Voice compliance with activity modification.  Report improvement in symptoms.    LTGs, 6 weeks: Improve NDI to 10%.  Upper cervical ROM to be wfl.  Tolerate return to recreational exercise.  Report minimal to no limitation with reading or driving.  Independent with HEP.    Plan  Therapy options: will be seen for skilled therapy services  Other planned modality interventions: modalities prn  Planned therapy interventions: flexibility, body mechanics training, functional ROM exercises, home exercise program, manual therapy, neuromuscular re-education, postural training, strengthening, stretching and therapeutic activities  Frequency: 2x week  Duration in weeks: 6  Treatment plan discussed with: patient        History # of Personal Factors and/or Comorbidities: LOW (0)  Examination of Body System(s): # of elements: LOW (1-2)  Clinical Presentation: STABLE   Clinical Decision Making: LOW      Timed:         Manual Therapy:    8    mins  60384;     Therapeutic Exercise:     8    mins  63589;     Neuromuscular Drew:    8    mins  38966;    Therapeutic Activity:          mins  45166;     Gait Training:           mins  39856;     Ultrasound:          mins  90261;    Ionto                                   mins   00557  Self Care                            mins   88734    Un-Timed:  Electrical Stimulation:         mins  85405 ( );  Traction          mins 99033  Low Eval     15     Mins  39152  Mod Eval          Mins  78965  High Eval                            Mins  53576  Re-Eval                               mins  61004        Timed Treatment:   24   mins   Total Treatment:     39   mins      PT SIGNATURE: Raymond Roy PT, DPT, OCS  [place] license: [id number]  DATE TREATMENT INITIATED: 10/12/2022    Certification Period: @TDA @thru 1/9/2023  I certify that the therapy services are furnished while this patient is under my care.  The services  outlined above are required for this patient and will be reviewed every 90 days.     PHYSICIAN: _____________________________    Les Davey MD      DATE:     Please sign and return via fax to [unfilled] . Thank you, [facility].

## 2022-10-18 ENCOUNTER — TREATMENT (OUTPATIENT)
Dept: PHYSICAL THERAPY | Facility: CLINIC | Age: 52
End: 2022-10-18

## 2022-10-18 DIAGNOSIS — M54.2 PAIN, NECK: Primary | ICD-10-CM

## 2022-10-18 PROCEDURE — 97110 THERAPEUTIC EXERCISES: CPT | Performed by: PHYSICAL THERAPIST

## 2022-10-18 PROCEDURE — 97140 MANUAL THERAPY 1/> REGIONS: CPT | Performed by: PHYSICAL THERAPIST

## 2022-10-18 NOTE — PROGRESS NOTES
Physical Therapy Daily Progress Note      Patient: Alexx Workman   : 1970  Diagnosis/ICD-10 Code:  Pain, neck [M54.2]  Referring practitioner: Les Davey MD  Date of Initial Visit: Type: THERAPY  Noted: 10/12/2022  Today's Date: 10/18/2022  Patient seen for 2 sessions             Subjective Pt reports neck was sore after last visit for about a day, but since then he has not had much pain to report.    Objective   See Exercise, Manual, and Modality Logs for complete treatment.       Assessment/Plan  Pt responded very well to manual techniques today.  Right cervical rotation is still limited. No issues noted with previous and new exercises.    Progress per Plan of Care           Timed:         Manual Therapy:    25     mins  78559;     Therapeutic Exercise:    15     mins  37432;         Timed Treatment:   40   mins   Total Treatment:     40   mins        Edgar Armstrong PTA  Physical Therapist Assistant

## 2022-10-20 ENCOUNTER — TREATMENT (OUTPATIENT)
Dept: PHYSICAL THERAPY | Facility: CLINIC | Age: 52
End: 2022-10-20

## 2022-10-20 DIAGNOSIS — M54.2 PAIN, NECK: Primary | ICD-10-CM

## 2022-10-20 PROCEDURE — 97110 THERAPEUTIC EXERCISES: CPT | Performed by: PHYSICAL THERAPIST

## 2022-10-20 PROCEDURE — 97530 THERAPEUTIC ACTIVITIES: CPT | Performed by: PHYSICAL THERAPIST

## 2022-10-20 PROCEDURE — 97140 MANUAL THERAPY 1/> REGIONS: CPT | Performed by: PHYSICAL THERAPIST

## 2022-10-20 NOTE — PROGRESS NOTES
Physical Therapy Daily Treatment Note    VISIT#: 3    Subjective   Alexx Workman reports that he hasn't noticed any changes in his symptoms so far, but his pain is intermittent and he has not had much lately.   Pain Ratin    Objective     See Exercise, Manual, and Modality Logs for complete treatment.     Patient Education: postural awareness/correcting throughout day, HA snag as needed    Assessment/Plan   Pt had no noted tenderness or pain with manual therapy. Progressed exercises with good tolerance and added HA snag to HEP as needed. Pt and PTA discussed postural awareness with working and driving and how to correct for decreased strain on the cervical spine.     Progress per Plan of Care and Progress strengthening /stabilization /functional activity          Timed:         Manual Therapy:    24     mins  44106;     Therapeutic Exercise:    10     mins  69576;     Neuromuscular Drew:        mins  91001;    Therapeutic Activity:     8     mins  87250;     Gait Training:           mins  62630;     Ultrasound:          mins  06519;    Ionto                                   mins   43760  Self Care                            mins   21783  Canalith Repos                   mins  34446    Un-Timed:  Electrical Stimulation:         mins  27016 ( );  Dry Needling          mins self-pay  Traction          mins 66724  Low Eval          Mins  73840  Mod Eval          Mins  80433  High Eval                            Mins  82470  Re-Eval                               mins  78085    Timed Treatment:   42   mins   Total Treatment:     42   mins          Mago N[place]  Physical Therapist Assistant  [place] License # [id number]

## 2022-10-25 ENCOUNTER — TREATMENT (OUTPATIENT)
Dept: PHYSICAL THERAPY | Facility: CLINIC | Age: 52
End: 2022-10-25

## 2022-10-25 DIAGNOSIS — M54.2 PAIN, NECK: Primary | ICD-10-CM

## 2022-10-25 PROCEDURE — 97112 NEUROMUSCULAR REEDUCATION: CPT | Performed by: PHYSICAL THERAPIST

## 2022-10-25 PROCEDURE — 97140 MANUAL THERAPY 1/> REGIONS: CPT | Performed by: PHYSICAL THERAPIST

## 2022-10-25 PROCEDURE — 97110 THERAPEUTIC EXERCISES: CPT | Performed by: PHYSICAL THERAPIST

## 2022-10-25 NOTE — PROGRESS NOTES
Physical Therapy Daily Treatment Note  [facility]  [address] [place] [address]     Patient: Alexx Workman   : 1970   Referring practitioner: Les Davey MD  Date of initial visit: Type: THERAPY  Noted: 10/12/2022   Today's date: 10/25/2022   Patient seen for 4 visits    Visit Diagnoses:    ICD-10-CM ICD-9-CM   1. Pain, neck  M54.2 723.1       Subjective   Pt reports: Unsure if he is improving, has not gone on a long car ride lately. HEP going well.      Objective     See Exercise, Manual, and Modality Logs for complete treatment.     Patient Education: HEP    Assessment/Plan SNAG to right more limited than L .       Progress per Plan of Care            Timed:         Manual Therapy:    8     mins  20961;     Therapeutic Exercise:    8     mins  41496;     Neuromuscular Drew:    23    mins  01462;    Therapeutic Activity:          mins  51887;     Gait Training:           mins  74863;     Ultrasound:          mins  51104;    Ionto                                   mins   02454  Self Care                            mins   06041    Un-Timed:  Electrical Stimulation:         mins  78172 ( );  Traction          mins 55845  Low Eval          Mins  72764  Mod Eval          Mins  73598  High Eval                            Mins  70171  Re-Eval                               mins  50773    Timed Treatment:   39   mins   Total Treatment:     39   mins      Raymond Roy PT, DPT, OCS  [place] license: [id number]  Physical Therapist   110

## 2022-10-27 ENCOUNTER — TELEPHONE (OUTPATIENT)
Dept: PHYSICAL THERAPY | Facility: CLINIC | Age: 52
End: 2022-10-27

## 2022-11-01 ENCOUNTER — TREATMENT (OUTPATIENT)
Dept: PHYSICAL THERAPY | Facility: CLINIC | Age: 52
End: 2022-11-01

## 2022-11-01 DIAGNOSIS — M54.2 PAIN, NECK: Primary | ICD-10-CM

## 2022-11-01 PROCEDURE — 97140 MANUAL THERAPY 1/> REGIONS: CPT | Performed by: PHYSICAL THERAPIST

## 2022-11-01 PROCEDURE — 97112 NEUROMUSCULAR REEDUCATION: CPT | Performed by: PHYSICAL THERAPIST

## 2022-11-01 PROCEDURE — 97110 THERAPEUTIC EXERCISES: CPT | Performed by: PHYSICAL THERAPIST

## 2022-11-01 NOTE — PROGRESS NOTES
Physical Therapy Daily Progress Note      Patient: Alexx Workman   : 1970  Diagnosis/ICD-10 Code:  Pain, neck [M54.2]  Referring practitioner: Les Davey MD  Date of Initial Visit: Type: THERAPY  Noted: 10/12/2022  Today's Date: 2022  Patient seen for 5 sessions             Subjective No significant change in status since last visit.  He noticed pain in the right side of his neck while doing yard work yesterday.  He also notices pain behind his right ear.    Objective   See Exercise, Manual, and Modality Logs for complete treatment.       Assessment/Plan  Pt responds well to manual techniques, stating he does not have much if any pain afterwards.  Good understanding and tolerance with exercises.    Progress per Plan of Care           Timed:         Manual Therapy:    23     mins  43221;     Therapeutic Exercise:    10     mins  38643;     Neuromuscular Drew:    12    mins  93854;      Timed Treatment:   45   mins   Total Treatment:     45   mins        Edgar Armstrong PTA  Physical Therapist Assistant

## 2022-11-03 ENCOUNTER — TREATMENT (OUTPATIENT)
Dept: PHYSICAL THERAPY | Facility: CLINIC | Age: 52
End: 2022-11-03

## 2022-11-03 DIAGNOSIS — M54.2 PAIN, NECK: Primary | ICD-10-CM

## 2022-11-03 PROCEDURE — 97112 NEUROMUSCULAR REEDUCATION: CPT | Performed by: PHYSICAL THERAPIST

## 2022-11-03 PROCEDURE — 97140 MANUAL THERAPY 1/> REGIONS: CPT | Performed by: PHYSICAL THERAPIST

## 2022-11-03 PROCEDURE — 97110 THERAPEUTIC EXERCISES: CPT | Performed by: PHYSICAL THERAPIST

## 2022-11-03 NOTE — PROGRESS NOTES
Physical Therapy Daily Treatment Note  [facility]  [address] [place] [address]     Patient: Alexx Workman   : 1970   Referring practitioner: Les Davey MD  Date of initial visit: Type: THERAPY  Noted: 10/12/2022   Today's date: 11/3/2022   Patient seen for 6 visits    Visit Diagnoses:    ICD-10-CM ICD-9-CM   1. Pain, neck  M54.2 723.1       Subjective   Pt reports: Able to go on a motorcycle ride to [facility] without limitation. However, pt reports he does not feel his symptoms or function have improved.       Objective     See Exercise, Manual, and Modality Logs for complete treatment.     Patient Education: HEP    Assessment/Plan Reassess next visit. Less pain and stiffness post visit.      Progress per Plan of Care            Timed:         Manual Therapy:    23     mins  95326;     Therapeutic Exercise:    8     mins  72982;     Neuromuscular Drew:    8    mins  47098;    Therapeutic Activity:          mins  48908;     Gait Training:           mins  05839;     Ultrasound:          mins  89050;    Ionto                                   mins   35475  Self Care                            mins   55158    Un-Timed:  Electrical Stimulation:         mins  63823 ( );  Traction          mins 54903  Low Eval          Mins  39074  Mod Eval          Mins  46731  High Eval                            Mins  01200  Re-Eval                               mins  95820    Timed Treatment:   39   mins   Total Treatment:     39   mins      Raymond Roy, PT, DPT, OCS  [place] license: [id number]  Physical Therapist

## 2022-11-10 ENCOUNTER — TREATMENT (OUTPATIENT)
Dept: PHYSICAL THERAPY | Facility: CLINIC | Age: 52
End: 2022-11-10

## 2022-11-10 DIAGNOSIS — M54.2 PAIN, NECK: Primary | ICD-10-CM

## 2022-11-10 PROCEDURE — 97112 NEUROMUSCULAR REEDUCATION: CPT | Performed by: PHYSICAL THERAPIST

## 2022-11-10 PROCEDURE — 97110 THERAPEUTIC EXERCISES: CPT | Performed by: PHYSICAL THERAPIST

## 2022-11-10 NOTE — PROGRESS NOTES
Re-Assessment / Re-Certification        Patient: Alexx Workman   : 1970  Diagnosis/ICD-10 Code:  Pain, neck [M54.2]  Referring practitioner: Les Davey MD  Date of Initial Visit: Type: THERAPY  Noted: 10/12/2022  Today's Date: 11/10/2022  Patient seen for 7 sessions      Subjective:     Subjective Questionnaire: NDI:14%  Clinical Progress: Mixed  Home Program Compliance: Yes  Treatment has included: therapeutic exercise, neuromuscular re-education and manual therapy    Subjective 4-5/10 pain at worst along the R suboccipital area per pt. Reports he is generally with 0/10 pain. Pt does not feel he has made significant improvement. Feels his ROM has improved somewhat but still limited. Had an episode of exacerbation 2 weeks ago. Reports he has not noticed a consistent pattern related to what exacerbates or relieves his pain symptoms.  Objective   Active Range of Motion     Additional Active Range of Motion Details  Upper cervical rotation to right is 50 deg, L is wnl  Assessment/Plan   Pt continues to have episodes of exacerbation but no consistent pattern has been determined. His upper cervical mobility and outcome measure has improved somewhat vs IE date. Mechanical nature of pt's symptoms is unknown to this point. Recommend pt return to MD due to lack of significant or consistent progress.    Goals  Plan Goals: Short term goals, 1 week: Tolerate HEP progression. met  Voice compliance with activity modification. met  Report improvement in symptoms. met    LTGs, 6 weeks: Improve NDI to 10%. Not met  Upper cervical ROM to be wfl. met  Tolerate return to recreational exercise. Not met  Report minimal to no limitation with reading or driving. intermittent  Independent with HEP. Met      Pt to return to MD due to lack of consistent progress    Raymond Roy, PT, DPT, OCS  [place] license: [id number]  Physical Therapist      Based upon review of the patient's progress and continued therapy plan, it is my medical  opinion that Alexx Workman should continue physical therapy treatment at [facility] TH[facility]AP[facility]  [address] [place] [address] [phone].    Provider: _____________________________    Les Davey MD     Timed:         Manual Therapy:         mins  62553;     Therapeutic Exercise:    10     mins  43817;     Neuromuscular Drew:    23    mins  88487;    Therapeutic Activity:          mins  23450;     Gait Training:           mins  25527;     Ultrasound:          mins  39427;    Ionto                                   mins   56075  Self Care                            mins   88073    Un-Timed:  Electrical Stimulation:         mins  94474 ( );  Traction          mins 46585  Low Eval          Mins  42321  Mod Eval          Mins  60394  High Eval                            Mins  61479  Re-Eval                               mins  32648      Timed Treatment:   33   mins   Total Treatment:     33   mins

## 2022-12-15 ENCOUNTER — TELEPHONE (OUTPATIENT)
Dept: FAMILY MEDICINE CLINIC | Facility: CLINIC | Age: 52
End: 2022-12-15

## 2022-12-15 DIAGNOSIS — G47.33 OSA (OBSTRUCTIVE SLEEP APNEA): Primary | ICD-10-CM

## 2022-12-15 NOTE — TELEPHONE ENCOUNTER
Caller: Alexx Workman    Relationship: Self    Best call back number: [phone]    What orders are you requesting (i.e. lab or imaging): WILL NEED ORDERS FOR A NEW SLEEP STUDY. NEEDS A NEW C PAP MACHINE     In what timeframe would the patient need to come in: AS SOON AS POSSIBLE    Where will you receive your lab/imaging services:     Additional notes:

## 2023-01-18 ENCOUNTER — TELEPHONE (OUTPATIENT)
Dept: FAMILY MEDICINE CLINIC | Facility: CLINIC | Age: 53
End: 2023-01-18
Payer: COMMERCIAL

## 2023-01-18 NOTE — TELEPHONE ENCOUNTER
Caller: Alexx Workman    Relationship: Self    Best call back number: [phone]    What orders are you requesting (i.e. lab or imaging): SLEEP STUDY     In what timeframe would the patient need to come in: AS SOON AS POSSIBLE    Where will you receive your lab/imaging services: [facility] [place]    Additional notes:     PATIENT STATES THE SLEEP STUDY OFFICE HAS RECEIVED ORDER BUT THEY NEED DOCTOR MANOLO TO CALL AND SPECIFICALLY INDICATE THAT PATIENT NEEDS A SLEEP STUDY TO GET HIM IN SOONER.    [facility]  [facility] [place] [address] [phone]

## 2023-01-19 DIAGNOSIS — G47.33 OSA (OBSTRUCTIVE SLEEP APNEA): Primary | ICD-10-CM

## 2023-02-24 RX ORDER — EMPAGLIFLOZIN 25 MG/1
TABLET, FILM COATED ORAL
Qty: 90 TABLET | Refills: 4 | Status: SHIPPED | OUTPATIENT
Start: 2023-02-24

## 2023-03-14 ENCOUNTER — OFFICE VISIT (OUTPATIENT)
Dept: SLEEP MEDICINE | Facility: CLINIC | Age: 53
End: 2023-03-14
Payer: COMMERCIAL

## 2023-03-14 VITALS
BODY MASS INDEX: 25.48 KG/M2 | HEIGHT: 70 IN | WEIGHT: 178 LBS | DIASTOLIC BLOOD PRESSURE: 80 MMHG | HEART RATE: 78 BPM | OXYGEN SATURATION: 98 % | SYSTOLIC BLOOD PRESSURE: 126 MMHG

## 2023-03-14 DIAGNOSIS — G47.8 NON-RESTORATIVE SLEEP: ICD-10-CM

## 2023-03-14 DIAGNOSIS — G47.30 OBSERVED SLEEP APNEA: Primary | ICD-10-CM

## 2023-03-14 DIAGNOSIS — R06.83 SNORING: ICD-10-CM

## 2023-03-14 PROCEDURE — 99204 OFFICE O/P NEW MOD 45 MIN: CPT | Performed by: INTERNAL MEDICINE

## 2023-03-14 PROCEDURE — G0463 HOSPITAL OUTPT CLINIC VISIT: HCPCS

## 2023-03-14 NOTE — PROGRESS NOTES
[facility]  [address] [place] [phone]  Phone   Fax       Alexx Workman  [id number]   1970  52 y.o.  male      Referring physician/provider and PCP David Lazo PA-C    Type of service: Initial Sleep Medicine Consult.  Date of service: 3/14/2023      Chief Complaint   Patient presents with   • Witnessed Apnea   • Snoring   • Non-restorative Sleep   • Fatigue   • Dry Mouth       History of present illness;  Thank you for asking to see Alexx Workman, 52 y.o.. The patient was seen today on 3/14/2023 at [facility].  The patient presents today with symptoms of snoring, non-restorative sleep and witnessed apneas. The symptoms are present for 15 years and they are persistent in nature.  The snoring is present in all positions and it is loud.  Patient has  prior surgery namely tonsillectomy, in 1970s    Previously had a sleep test in 2008 in [place] but unable to get the medical records.  His CPAP is very old.  He needs a new CPAP and without the CPAP patient reports that his snoring and daytime excessive sleepiness is significant.  He is a     Patient gives the following sleep history.  Sleep schedule:  Bedtime: 11:30 PM  Wake time: 730  Normally takes about 10-15 minutes to fall asleep  Average hours of sleep 7  Number of naps per day none  Symptoms   In addition to snoring, nonrestorative sleep and witnessed apneas patient gives the following associated symptoms.  Have you ever awakened gasping for breath, coughing, choking: No   Change in weight,  No       MEDICAL CONDITIONS (PMH)   1. Sleep apnea  2. Hyperlipidemia  3. Diabetes    Social history:  Do you drive a commercial vehicle:  No   Shift work:  No   Tobacco use:  No   Alcohol use: 0 per week  Caffeinated drinks: 3      Family Hx (parents and siblings) (pertaining to sleep medicine)  1. Diabetes mellitus  2. Thyroid disorder  3. Hypertension    Medications:  "reviewed    Review of systems:  Positive symptoms are :  • Snoring  • Witnessed apnea  • Daytime excessive sleepiness with [place] Sleepiness Scale of Total score: 2   • Fatigue       Physical exam:  CONSTITUTINONAL:  Vitals:    03/14/23 1040   BP: 126/80   BP Location: Left arm   Patient Position: Sitting   Cuff Size: Adult   Pulse: 78   SpO2: 98%   Weight: 80.7 kg (178 lb)   Height: 177.8 cm (70\")    Body mass index is 25.54 kg/m².   NOSE:no nasal septal defects, nasal passages are clear, no nasal polyps,   THROAT: tonsils are not enlarged, tongue normal size, oral airway Mallampati class 3  NECK: , trachea is in the midline, thyroid not enlarged  RESPIRATORY SYSTEM: Breath sounds are normal, there are no wheezes  CARDIOVASULAR SYSTEM: Heart sounds are regular rhythm and normal rate, no edema  NEUROLOGICAL SYSTEM: Oriented x 3, No speech defect, gait is normal  PSYCHIATRIC SYSTEM: Mood is normal, thought content is normal    Labs reviewed.  TSH Results:     Most Recent A1C    HGBA1C Most Recent 9/19/22   Hemoglobin A1C 6.2 (A)   (A) Abnormal value               Assessment and plan:  · Witnessed apneas,(R06.81) patient's symptoms and examination is consistent with sleep apnea (G47.30). I have talked to the patient about the signs and symptoms of sleep apnea. In addition, I have also discussed pathophysiology of sleep apnea.  I also discussed the complications of untreated sleep apnea including effects on hypertension, diabetes mellitus and nonrestorative sleep with hypersomnia which can increase risk for motor vehicle accidents.  Untreated sleep apnea is also a risk factor for development of atrial fibrillation, pulmonary hypertension, and insulin resistance and stroke.  Discussed in detail of various testing methods including home-based and lab based sleep studies.  Based on history and physical examination and other comorbidities the most appropriate study is home sleep test.  The order for the sleep study is " placed in Epic.  The test will be scheduled after approval from insurance. Treatment and management will be discussed after the test is completed.  Patient was given opportunity to ask questions and all the questions were answered.   · Snoring (R06.83), snoring is the sound created by turbulent airflow vibrating upper airway soft tissue due to limitation of inspiratory airflow. I have also discussed factors affecting snoring including sleep deprivation, sleeping on the back and alcohol ingestion. To minimize snoring, patient is advised to have adequate sleep, sleep on the side and avoid alcohol and sedative medications before bedtime  · Nonrestorative,      I have also discussed with the patient the following  • Sleep hygiene: Maintaining a regular bedtime and wake time, not to watch television or work in bed, limit caffeine-containing beverages before bed time and avoid naps during the day  • Adequate amount of sleep.  Generally most people needs about 7 to 8 hours of sleep.    Return for 31 to 90 days after PAP setup with down load..  Patient's questions were answered      I once again thank you for asking me to see this patient in consultation and I have forwarded my opinion and treatment plan.  Please do not hesitate to call me if you have any questions.   3/14/2023  Brian Granados MD  Sleep Medicine  Medical Director  [facility]

## 2023-03-21 RX ORDER — SITAGLIPTIN AND METFORMIN HYDROCHLORIDE 1000; 50 MG/1; MG/1
TABLET, FILM COATED, EXTENDED RELEASE ORAL
Qty: 180 TABLET | Refills: 0 | Status: SHIPPED | OUTPATIENT
Start: 2023-03-21

## 2023-03-22 ENCOUNTER — HOSPITAL ENCOUNTER (OUTPATIENT)
Dept: SLEEP MEDICINE | Facility: HOSPITAL | Age: 53
Discharge: HOME | End: 2023-03-22
Admitting: INTERNAL MEDICINE
Payer: COMMERCIAL

## 2023-03-22 DIAGNOSIS — R06.83 SNORING: ICD-10-CM

## 2023-03-22 DIAGNOSIS — G47.30 OBSERVED SLEEP APNEA: ICD-10-CM

## 2023-03-22 DIAGNOSIS — G47.8 NON-RESTORATIVE SLEEP: ICD-10-CM

## 2023-03-22 PROCEDURE — 95806 SLEEP STUDY UNATT&RESP EFFT: CPT

## 2023-03-28 DIAGNOSIS — G47.30 OBSERVED SLEEP APNEA: Primary | ICD-10-CM

## 2023-03-28 DIAGNOSIS — G47.33 OSA (OBSTRUCTIVE SLEEP APNEA): ICD-10-CM

## 2023-03-28 DIAGNOSIS — G47.8 NON-RESTORATIVE SLEEP: ICD-10-CM

## 2023-03-28 DIAGNOSIS — R06.83 SNORING: ICD-10-CM

## 2023-03-28 PROCEDURE — 95806 SLEEP STUDY UNATT&RESP EFFT: CPT | Performed by: INTERNAL MEDICINE

## 2023-05-03 RX ORDER — ATORVASTATIN CALCIUM 10 MG/1
TABLET, FILM COATED ORAL
Qty: 90 TABLET | Refills: 4 | Status: SHIPPED | OUTPATIENT
Start: 2023-05-03

## 2023-05-04 ENCOUNTER — DOCUMENTATION (OUTPATIENT)
Dept: ENDOCRINOLOGY | Facility: CLINIC | Age: 53
End: 2023-05-04
Payer: COMMERCIAL

## 2023-05-04 NOTE — PROGRESS NOTES
Benefits Investigation Summary    Prescription: Renewal     Dispensing pharmacy: [facility]    Copay amount: Jardiance-$90.00/90 day, $30.00/30 day  Janumet XR-$90.00/90 day, $30.00/30 day  *w/ out copay cards*    PLAN: [facility]  BIN: [id number]  PCN: [id number]  RX GROUP: [id number]    Prior Auth and Med Assistance notes: NONE    Mena Can CPhT

## 2023-05-05 ENCOUNTER — LAB (OUTPATIENT)
Dept: LAB | Facility: HOSPITAL | Age: 53
End: 2023-05-05
Payer: COMMERCIAL

## 2023-05-05 DIAGNOSIS — E11.65 TYPE 2 DIABETES MELLITUS WITH HYPERGLYCEMIA, WITHOUT LONG-TERM CURRENT USE OF INSULIN: ICD-10-CM

## 2023-05-05 LAB
ALBUMIN SERPL-MCNC: 4.4 G/DL (ref 3.5–5.2)
ALBUMIN UR-MCNC: <1.2 MG/DL
ALBUMIN/GLOB SERPL: 2 G/DL
ALP SERPL-CCNC: 65 U/L (ref 39–117)
ALT SERPL W P-5'-P-CCNC: 18 U/L (ref 1–41)
ANION GAP SERPL CALCULATED.3IONS-SCNC: 9.5 MMOL/L (ref 5–15)
AST SERPL-CCNC: 14 U/L (ref 1–40)
BILIRUB SERPL-MCNC: 0.4 MG/DL (ref 0–1.2)
BUN SERPL-MCNC: 20 MG/DL (ref 6–20)
BUN/CREAT SERPL: 16.4 (ref 7–25)
CALCIUM SPEC-SCNC: 9.3 MG/DL (ref 8.6–10.5)
CHLORIDE SERPL-SCNC: 104 MMOL/L (ref 98–107)
CHOLEST SERPL-MCNC: 96 MG/DL (ref 0–200)
CO2 SERPL-SCNC: 26.5 MMOL/L (ref 22–29)
CREAT SERPL-MCNC: 1.22 MG/DL (ref 0.76–1.27)
CREAT UR-MCNC: 159.3 MG/DL
EGFRCR SERPLBLD CKD-EPI 2021: 70.5 ML/MIN/1.73
GLOBULIN UR ELPH-MCNC: 2.2 GM/DL
GLUCOSE SERPL-MCNC: 125 MG/DL (ref 65–99)
HBA1C MFR BLD: 6.6 % (ref 4.8–5.6)
HDLC SERPL-MCNC: 38 MG/DL (ref 40–60)
LDLC SERPL CALC-MCNC: 40 MG/DL (ref 0–100)
LDLC/HDLC SERPL: 1.04 {RATIO}
MICROALBUMIN/CREAT UR: NORMAL MG/G{CREAT}
POTASSIUM SERPL-SCNC: 4.7 MMOL/L (ref 3.5–5.2)
PROT SERPL-MCNC: 6.6 G/DL (ref 6–8.5)
SODIUM SERPL-SCNC: 140 MMOL/L (ref 136–145)
TRIGL SERPL-MCNC: 92 MG/DL (ref 0–150)
VLDLC SERPL-MCNC: 18 MG/DL (ref 5–40)

## 2023-05-05 PROCEDURE — 80053 COMPREHEN METABOLIC PANEL: CPT

## 2023-05-05 PROCEDURE — 82570 ASSAY OF URINE CREATININE: CPT

## 2023-05-05 PROCEDURE — 83036 HEMOGLOBIN GLYCOSYLATED A1C: CPT

## 2023-05-05 PROCEDURE — 82043 UR ALBUMIN QUANTITATIVE: CPT

## 2023-05-05 PROCEDURE — 80061 LIPID PANEL: CPT

## 2023-05-05 PROCEDURE — 36415 COLL VENOUS BLD VENIPUNCTURE: CPT

## 2023-05-12 ENCOUNTER — SPECIALTY PHARMACY (OUTPATIENT)
Dept: ENDOCRINOLOGY | Facility: CLINIC | Age: 53
End: 2023-05-12
Payer: COMMERCIAL

## 2023-05-12 ENCOUNTER — OFFICE VISIT (OUTPATIENT)
Dept: ENDOCRINOLOGY | Facility: CLINIC | Age: 53
End: 2023-05-12
Payer: COMMERCIAL

## 2023-05-12 VITALS
HEART RATE: 64 BPM | SYSTOLIC BLOOD PRESSURE: 125 MMHG | WEIGHT: 175 LBS | DIASTOLIC BLOOD PRESSURE: 80 MMHG | BODY MASS INDEX: 25.05 KG/M2 | OXYGEN SATURATION: 99 % | HEIGHT: 70 IN

## 2023-05-12 DIAGNOSIS — E11.65 TYPE 2 DIABETES MELLITUS WITH HYPERGLYCEMIA, WITHOUT LONG-TERM CURRENT USE OF INSULIN: Primary | ICD-10-CM

## 2023-05-12 DIAGNOSIS — E55.9 VITAMIN D DEFICIENCY: ICD-10-CM

## 2023-05-12 DIAGNOSIS — E78.2 MIXED HYPERLIPIDEMIA: ICD-10-CM

## 2023-05-12 LAB — GLUCOSE BLDC GLUCOMTR-MCNC: 132 MG/DL (ref 70–105)

## 2023-05-12 PROCEDURE — 82948 REAGENT STRIP/BLOOD GLUCOSE: CPT | Performed by: INTERNAL MEDICINE

## 2023-05-12 NOTE — PROGRESS NOTES
Endocrine Progress Note Outpatient     Patient Care Team:  David Lazo PA-C as PCP - General (Physician Assistant)    Chief Complaint: Follow-up type 2 diabetes:    HPI: 53-year-old male with history of type 2 diabetes, hyperlipidemia and vitamin D deficiency is here for follow-up.    For type 2 diabetes he is currently on Janumet XR 50/1000, 2 tablets daily and Jardiance 25 mg p.o. daily.  He is tolerating his medications well.  Unfortunately has not been checking blood sugars at home and has not been able to follow diet.       Hyperlipidemia: On atorvastatin    Vitamin D deficiency: On replacement.    Past Medical History:   Diagnosis Date   • Frozen shoulder    • Hyperlipidemia    • LEANNA (obstructive sleep apnea)    • Type 2 diabetes mellitus        Social History     Socioeconomic History   • Marital status:      Spouse name: Sailaja   • Number of children: 2   • Years of education: 23   Tobacco Use   • Smoking status: Never   • Smokeless tobacco: Never   Vaping Use   • Vaping Use: Never used   Substance and Sexual Activity   • Alcohol use: No   • Drug use: Never   • Sexual activity: Defer       Family History   Problem Relation Age of Onset   • Lupus Mother    • Hypertension Mother    • Cancer Mother         lung / breast / skin   • Diabetes Father    • Cancer Father         myeloma    • Diabetes Brother        No Known Allergies    ROS:   Constitutional:  Denies fatigue, tiredness.    Eyes:  Denies change in visual acuity   HENT:  Denies nasal congestion or sore throat   Respiratory: denies cough, shortness of breath.   Cardiovascular:  denies chest pain, edema   GI:  Denies abdominal pain, nausea, vomiting.   Musculoskeletal:  Denies back pain or joint pain   Integument:  Denies dry skin and rash   Neurologic:  Denies headache, focal weakness or sensory changes   Endocrine:  Denies polyuria or polydipsia   Psychiatric:  Denies depression or anxiety      Vitals:    05/12/23 0845   BP: 125/80    Pulse: 64   SpO2: 99%     BMI: 25.1  Physical Exam:  GEN: NAD, conversant  EYES: EOMI, PERRL, no conjunctival erythema  NECK: no thyromegaly, full ROM   CV: RRR, no murmurs/rubs/gallops, no peripheral edema  LUNG: CTAB, no wheezes/rales/ronchi  SKIN: no rashes, no acanthosis  MSK: no deformities, full ROM of all extremities  NEURO: no tremors, DTR normal  PSYCH: AOX3, appropriate mood, affect normal      Results Review:     I reviewed the patient's new clinical results.    Lab Results   Component Value Date    HGBA1C 6.60 (H) 05/05/2023    HGBA1C 6.2 (H) 09/19/2022    HGBA1C 7.3 (H) 02/09/2022      Lab Results   Component Value Date    GLUCOSE 125 (H) 05/05/2023    BUN 20 05/05/2023    CREATININE 1.22 05/05/2023    EGFRIFNONA 67 02/09/2022    BCR 16.4 05/05/2023    K 4.7 05/05/2023    CO2 26.5 05/05/2023    CALCIUM 9.3 05/05/2023    ALBUMIN 4.4 05/05/2023    LABIL2 1.6 03/01/2019    AST 14 05/05/2023    ALT 18 05/05/2023    CHOL 96 05/05/2023    TRIG 92 05/05/2023    LDL 40 05/05/2023    HDL 38 (L) 05/05/2023         Medication Review: Reviewed.       Current Outpatient Medications:   •  atorvastatin (LIPITOR) 10 MG tablet, TAKE ONE TABLET BY MOUTH DAILY, Disp: 90 tablet, Rfl: 4  •  Cyanocobalamin (Vitamin B-12) 1000 MCG sublingual tablet, PLACE ONE TABLET UNDER THE TONGUE DAILY, Disp: 90 each, Rfl: 1  •  glucose blood test strip, ONETOUCH ULTRA BLUE STRP, Disp: , Rfl:   •  Jardiance 25 MG tablet tablet, TAKE ONE TABLET BY MOUTH DAILY, Disp: 90 tablet, Rfl: 4  •  SITagliptin-metFORMIN HCl ER (Janumet XR)  MG tablet, TAKE ONE TABLET BY MOUTH TWICE A DAY, Disp: 180 tablet, Rfl: 0  •  vitamin D3 125 MCG (5000 UT) capsule capsule, Take 1 capsule by mouth Daily., Disp: 100 capsule, Rfl: 4      Assessment & Plan   1.  Diabetes mellitus type 2 with Hyperglycemia: Well-controlled, A1c at 6.6%.  At this time we will continue Janumet and Jardiance.  We will follow blood sugars and A1c.  Recommend annual eye  exam.    2.  Hyperlipidemia: Well-controlled, continue atorvastatin.    3.  Vitamin D deficiency: On vitamin D supplementation.  We will continue that.    Assessment and plan from September 26, 2022 reviewed and updated.            Ino Jean MD FACE.    Much of the above report is an electronic transcription/translation of the spoken language to printed text using Dragon Software. As such, the subtleties and finesse of the spoken language may permit erroneous, or at times, nonsensical words or phrases to be inadvertently transcribed; thus changes may be made at a later date to rectify these errors.

## 2023-05-12 NOTE — PROGRESS NOTES
Specialty Pharmacy Patient Management Program  Endocrinology Introduction to Services Outreach     Alexx Workman is a 53 y.o. male with Type 2 Diabetes seen by an Endocrinology provider. This was an initial visit to introduce Endocrinology Patient Management Program and Specialty Pharmacy services offered by [facility], as the patient is taking specialty medication Janumet and Jardiance.     Alexx Workman is undecided about filling specialty medication at [facility] and/or enrollment in the Endocrine Disorders Patient Management Program at this time and enrollment is therefore UNDER REVIEW. Patient remains eligible for services in the future if desired. Plan to follow-up at next visit.     Results of Benefits Investigation  Jardiance-$90.00/90 day, $30.00/30 day  Janumet XR-$90.00/90 day, $30.00/30 day  *w/ out copay cards*    Relevant Past Medical History and Comorbidities  Past Medical History:   Diagnosis Date   • Frozen shoulder    • Hyperlipidemia    • LEANNA (obstructive sleep apnea)    • Type 2 diabetes mellitus      Social History     Socioeconomic History   • Marital status:      Spouse name: Sailaja   • Number of children: 2   • Years of education: 23   Tobacco Use   • Smoking status: Never   • Smokeless tobacco: Never   Vaping Use   • Vaping Use: Never used   Substance and Sexual Activity   • Alcohol use: No   • Drug use: Never   • Sexual activity: Defer          Allergies  Patient has no known allergies.       Current Medication List    Current Outpatient Medications:   •  atorvastatin (LIPITOR) 10 MG tablet, TAKE ONE TABLET BY MOUTH DAILY, Disp: 90 tablet, Rfl: 4  •  Cyanocobalamin (Vitamin B-12) 1000 MCG sublingual tablet, PLACE ONE TABLET UNDER THE TONGUE DAILY, Disp: 90 each, Rfl: 1  •  glucose blood test strip, ONETOUCH ULTRA BLUE STRP, Disp: , Rfl:   •  Jardiance 25 MG tablet tablet, TAKE ONE TABLET BY MOUTH DAILY, Disp: 90 tablet, Rfl: 4  •   SITagliptin-metFORMIN HCl ER (Janumet XR)  MG tablet, TAKE ONE TABLET BY MOUTH TWICE A DAY, Disp: 180 tablet, Rfl: 0  •  vitamin D3 125 MCG (5000 UT) capsule capsule, Take 1 capsule by mouth Daily., Disp: 100 capsule, Rfl: 4       Changes to Therapy Plan Discussed with Patient  Medication Therapy Changes by Provider Continue Jardiance and Janumet.  Additional Plans, Therapy Recommendations, or Therapy Problems to Be Addressed: None     Molly Mckeon, PharmD, BCPS, BCCCP  Clinical Specialty Pharmacist, Endocrinology  5/12/2023  09:37 EDT

## 2023-05-12 NOTE — PATIENT INSTRUCTIONS
Continue current medication  Continue to work on your diet and activity  Annual eye exam  Labs before follow-up

## 2023-08-28 ENCOUNTER — TELEPHONE (OUTPATIENT)
Dept: FAMILY MEDICINE CLINIC | Facility: CLINIC | Age: 53
End: 2023-08-28
Payer: COMMERCIAL

## 2023-08-28 NOTE — TELEPHONE ENCOUNTER
Patient notified good till 09/01/2023 if the appointment is after 09/01/2023 call back for the referral

## 2023-08-28 NOTE — TELEPHONE ENCOUNTER
Caller: Alexx Workman    Relationship: Self    Best call back number: [phone]    What is the best time to reach you: ANYTIME     Who are you requesting to speak with (clinical staff, provider,  specific staff member): CLINICAL     What was the call regarding: PATIENT STATES HE IS WANTING TO KNOW IF THE COLONOSCOPY ORDER ALEX FRENCH HAD PLACED FOR HIM IS STILL ACTIVE OR NOT.     PATIENT IS REQUESTING A CALL BACK.

## 2023-09-22 ENCOUNTER — TELEPHONE (OUTPATIENT)
Dept: FAMILY MEDICINE CLINIC | Facility: CLINIC | Age: 53
End: 2023-09-22

## 2023-09-22 NOTE — TELEPHONE ENCOUNTER
Caller: Alexx Workman    Relationship: Self    Best call back number: [phone]    What is the medical concern/diagnosis: COLONOSCOPY    What specialty or service is being requested: GASTROENTEROLOGY    What is the provider, practice or medical service name: [facility]     What is the office location:     [address] [place] [address]    What is the office phone number:     [phone]    Any additional details: PATIENT IS WORKING WITH [facility] AND HAS TURNED IN HIS PACKET OF PAPERWORK, HE IS NOW NEEDING ANOTHER REFERRAL

## 2023-09-25 DIAGNOSIS — Z12.11 COLON CANCER SCREENING: Primary | ICD-10-CM

## 2023-10-06 ENCOUNTER — TELEPHONE (OUTPATIENT)
Dept: FAMILY MEDICINE CLINIC | Facility: CLINIC | Age: 53
End: 2023-10-06
Payer: COMMERCIAL

## 2023-10-06 NOTE — TELEPHONE ENCOUNTER
Caller: Alexx Workman    Relationship: Self    Best call back number: [phone]    What medication are you requesting: PLEASE ADVISE    What are your current symptoms:   BRIGHT YELLOW MUCAS, PAINFUL CHEEKS AND UNDER EYES, SINUS PRESSURE    How long have you been experiencing symptoms: 2 DAYS    Have you had these symptoms before:    [] Yes  [x] No    Have you been treated for these symptoms before:   [] Yes  [x] No    If a prescription is needed, what is your preferred pharmacy and phone number: [id number] - [place], [place] - [address] [place] [address] [phone] [facility] [phone]      Additional notes:  HAS BEEN TAKING OVER THE COUNTER SUDAFED.    PLEASE CALL TO CONFIRM OR DENY.

## 2023-10-06 NOTE — TELEPHONE ENCOUNTER
Caller: Alexx Workman    Relationship: Self    Best call back number: [phone]     What was the call regarding: PATIENT WAS CALLING TO CHECK ON THIS REQUEST, PLEASE ADVISE.

## 2023-10-09 NOTE — TELEPHONE ENCOUNTER
Patient went urgent care Friday 10/06/2023. Patient called at 8:30 a m on 10/06/2023 and is unhappy that he got no call back . Patient was given meds from urgent care down stairs. Patient believes he has COVID . Patient states he was told 4 times that he would get a call back. I called patient this morning and relayed the message and that's when he let me know that he was upset.

## 2023-12-21 ENCOUNTER — ON CAMPUS - OUTPATIENT (OUTPATIENT)
Dept: URBAN - METROPOLITAN AREA HOSPITAL 2 | Facility: HOSPITAL | Age: 53
End: 2023-12-21
Payer: COMMERCIAL

## 2023-12-21 ENCOUNTER — OFFICE (OUTPATIENT)
Dept: URBAN - METROPOLITAN AREA PATHOLOGY 4 | Facility: PATHOLOGY | Age: 53
End: 2023-12-21
Payer: COMMERCIAL

## 2023-12-21 VITALS
HEART RATE: 85 BPM | SYSTOLIC BLOOD PRESSURE: 134 MMHG | TEMPERATURE: 97.8 F | DIASTOLIC BLOOD PRESSURE: 73 MMHG | DIASTOLIC BLOOD PRESSURE: 76 MMHG | WEIGHT: 170 LBS | DIASTOLIC BLOOD PRESSURE: 65 MMHG | HEART RATE: 88 BPM | RESPIRATION RATE: 15 BRPM | DIASTOLIC BLOOD PRESSURE: 70 MMHG | RESPIRATION RATE: 14 BRPM | OXYGEN SATURATION: 98 % | SYSTOLIC BLOOD PRESSURE: 119 MMHG | DIASTOLIC BLOOD PRESSURE: 101 MMHG | DIASTOLIC BLOOD PRESSURE: 78 MMHG | HEIGHT: 70 IN | HEART RATE: 92 BPM | SYSTOLIC BLOOD PRESSURE: 120 MMHG | DIASTOLIC BLOOD PRESSURE: 63 MMHG | DIASTOLIC BLOOD PRESSURE: 82 MMHG | SYSTOLIC BLOOD PRESSURE: 128 MMHG | SYSTOLIC BLOOD PRESSURE: 103 MMHG | HEART RATE: 90 BPM | SYSTOLIC BLOOD PRESSURE: 115 MMHG | HEART RATE: 89 BPM | SYSTOLIC BLOOD PRESSURE: 150 MMHG | OXYGEN SATURATION: 97 % | SYSTOLIC BLOOD PRESSURE: 104 MMHG | RESPIRATION RATE: 16 BRPM

## 2023-12-21 DIAGNOSIS — Z12.11 ENCOUNTER FOR SCREENING FOR MALIGNANT NEOPLASM OF COLON: ICD-10-CM

## 2023-12-21 DIAGNOSIS — D12.4 BENIGN NEOPLASM OF DESCENDING COLON: ICD-10-CM

## 2023-12-21 DIAGNOSIS — K64.1 SECOND DEGREE HEMORRHOIDS: ICD-10-CM

## 2023-12-21 PROBLEM — K63.5 POLYP OF COLON: Status: ACTIVE | Noted: 2023-12-21

## 2023-12-21 LAB
GI HISTOLOGY: A. UNSPECIFIED: (no result)
GI HISTOLOGY: PDF REPORT: (no result)

## 2023-12-21 PROCEDURE — 88305 TISSUE EXAM BY PATHOLOGIST: CPT | Performed by: INTERNAL MEDICINE

## 2023-12-21 PROCEDURE — 45385 COLONOSCOPY W/LESION REMOVAL: CPT | Mod: 33 | Performed by: INTERNAL MEDICINE

## 2024-02-09 ENCOUNTER — LAB (OUTPATIENT)
Dept: LAB | Facility: HOSPITAL | Age: 54
End: 2024-02-09
Payer: COMMERCIAL

## 2024-02-09 DIAGNOSIS — E11.65 TYPE 2 DIABETES MELLITUS WITH HYPERGLYCEMIA, WITHOUT LONG-TERM CURRENT USE OF INSULIN: ICD-10-CM

## 2024-02-09 LAB
ALBUMIN SERPL-MCNC: 4.9 G/DL (ref 3.5–5.2)
ALBUMIN UR-MCNC: <1.2 MG/DL
ALBUMIN/GLOB SERPL: 2.7 G/DL
ALP SERPL-CCNC: 59 U/L (ref 39–117)
ALT SERPL W P-5'-P-CCNC: 19 U/L (ref 1–41)
ANION GAP SERPL CALCULATED.3IONS-SCNC: 9 MMOL/L (ref 5–15)
AST SERPL-CCNC: 16 U/L (ref 1–40)
BILIRUB SERPL-MCNC: 0.5 MG/DL (ref 0–1.2)
BUN SERPL-MCNC: 24 MG/DL (ref 6–20)
BUN/CREAT SERPL: 21.8 (ref 7–25)
CALCIUM SPEC-SCNC: 9 MG/DL (ref 8.6–10.5)
CHLORIDE SERPL-SCNC: 103 MMOL/L (ref 98–107)
CHOLEST SERPL-MCNC: 108 MG/DL (ref 0–200)
CO2 SERPL-SCNC: 27 MMOL/L (ref 22–29)
CREAT SERPL-MCNC: 1.1 MG/DL (ref 0.76–1.27)
CREAT UR-MCNC: 123.6 MG/DL
EGFRCR SERPLBLD CKD-EPI 2021: 79.8 ML/MIN/1.73
GLOBULIN UR ELPH-MCNC: 1.8 GM/DL
GLUCOSE SERPL-MCNC: 122 MG/DL (ref 65–99)
HBA1C MFR BLD: 6.5 % (ref 4.8–5.6)
HDLC SERPL-MCNC: 42 MG/DL (ref 40–60)
LDLC SERPL CALC-MCNC: 46 MG/DL (ref 0–100)
LDLC/HDLC SERPL: 1.06 {RATIO}
MICROALBUMIN/CREAT UR: NORMAL MG/G{CREAT}
POTASSIUM SERPL-SCNC: 4.3 MMOL/L (ref 3.5–5.2)
PROT SERPL-MCNC: 6.7 G/DL (ref 6–8.5)
SODIUM SERPL-SCNC: 139 MMOL/L (ref 136–145)
TRIGL SERPL-MCNC: 107 MG/DL (ref 0–150)
VLDLC SERPL-MCNC: 20 MG/DL (ref 5–40)

## 2024-02-09 PROCEDURE — 36415 COLL VENOUS BLD VENIPUNCTURE: CPT

## 2024-02-09 PROCEDURE — 83036 HEMOGLOBIN GLYCOSYLATED A1C: CPT

## 2024-02-09 PROCEDURE — 80053 COMPREHEN METABOLIC PANEL: CPT

## 2024-02-09 PROCEDURE — 82043 UR ALBUMIN QUANTITATIVE: CPT

## 2024-02-09 PROCEDURE — 80061 LIPID PANEL: CPT

## 2024-02-09 PROCEDURE — 82570 ASSAY OF URINE CREATININE: CPT

## 2024-02-15 ENCOUNTER — OFFICE VISIT (OUTPATIENT)
Dept: ENDOCRINOLOGY | Facility: CLINIC | Age: 54
End: 2024-02-15
Payer: COMMERCIAL

## 2024-02-15 VITALS
WEIGHT: 173 LBS | OXYGEN SATURATION: 98 % | DIASTOLIC BLOOD PRESSURE: 75 MMHG | HEART RATE: 88 BPM | BODY MASS INDEX: 24.77 KG/M2 | SYSTOLIC BLOOD PRESSURE: 122 MMHG | HEIGHT: 70 IN

## 2024-02-15 DIAGNOSIS — E55.9 VITAMIN D DEFICIENCY: ICD-10-CM

## 2024-02-15 DIAGNOSIS — E11.65 TYPE 2 DIABETES MELLITUS WITH HYPERGLYCEMIA, WITHOUT LONG-TERM CURRENT USE OF INSULIN: Primary | ICD-10-CM

## 2024-02-15 DIAGNOSIS — E78.2 MIXED HYPERLIPIDEMIA: ICD-10-CM

## 2024-02-15 LAB — GLUCOSE BLDC GLUCOMTR-MCNC: 138 MG/DL (ref 70–105)

## 2024-02-15 PROCEDURE — 82948 REAGENT STRIP/BLOOD GLUCOSE: CPT | Performed by: INTERNAL MEDICINE

## 2024-02-15 PROCEDURE — 99214 OFFICE O/P EST MOD 30 MIN: CPT | Performed by: INTERNAL MEDICINE

## 2024-02-27 RX ORDER — EMPAGLIFLOZIN 25 MG/1
TABLET, FILM COATED ORAL
Qty: 90 TABLET | Refills: 4 | Status: SHIPPED | OUTPATIENT
Start: 2024-02-27

## 2024-06-21 RX ORDER — SITAGLIPTIN AND METFORMIN HYDROCHLORIDE 1000; 50 MG/1; MG/1
TABLET, FILM COATED, EXTENDED RELEASE ORAL
Qty: 180 TABLET | Refills: 3 | Status: SHIPPED | OUTPATIENT
Start: 2024-06-21

## 2024-06-26 ENCOUNTER — TELEPHONE (OUTPATIENT)
Dept: ENDOCRINOLOGY | Facility: CLINIC | Age: 54
End: 2024-06-26
Payer: COMMERCIAL

## 2024-06-26 ENCOUNTER — DOCUMENTATION (OUTPATIENT)
Dept: ENDOCRINOLOGY | Facility: CLINIC | Age: 54
End: 2024-06-26
Payer: COMMERCIAL

## 2024-06-26 RX ORDER — SITAGLIPTIN AND METFORMIN HYDROCHLORIDE 1000; 50 MG/1; MG/1
1 TABLET, FILM COATED ORAL 2 TIMES DAILY
Qty: 180 TABLET | Refills: 1 | Status: SHIPPED | OUTPATIENT
Start: 2024-06-26

## 2024-06-26 NOTE — PROGRESS NOTES
Specialty Pharmacy Patient Management Program  One-Time Clinical Outreach     Alexx JOSE ARMANDO Workman is a 54 y.o. male seen by an Endocrinology provider for Type 2 Diabetes and enrolled in the Endocrinology Patient Management program offered by [facility].      Changed Janumet formulation from XR to IR as pt's insurance no longer covers XR, and he is completely out of this medication.  Pt opted to change to IR formulation, was educated to take 1 tablet PO BID (rather than 2 tabs once daily), and verbalized understanding.        Sean Chung, PharmD, MPH  Clinical Specialty Pharmacist, Endocrinology  6/26/2024  14:24 EDT

## 2024-06-26 NOTE — TELEPHONE ENCOUNTER
Specialty Pharmacy Patient Management Program       Alexx JOSE ARMANDO Workman is a 54 y.o. male seen by an Endocrinology provider for Type 2 Diabetes and enrolled in the Endocrinology Patient Management program offered by [facility].      Requested Prescriptions     Pending Prescriptions Disp Refills    SITagliptin-metFORMIN HCl ER (Janumet XR)  MG tablet 180 tablet 3     Sig: TAKE 1 TABLET BY MOUTH TWICE A DAY       Refill sent in to patient's pharmacy for above medication prescribed by Dr. Jean.   Last office visit 5/12/2024.  Next visit scheduled 8/27/2024.      Sean Chung, PharmD, MPH  Clinical Specialty Pharmacist, Endocrinology  6/26/2024  14:11 EDT

## 2024-07-31 RX ORDER — ATORVASTATIN CALCIUM 10 MG/1
TABLET, FILM COATED ORAL
Qty: 90 TABLET | Refills: 4 | Status: SHIPPED | OUTPATIENT
Start: 2024-07-31

## 2024-08-23 ENCOUNTER — LAB (OUTPATIENT)
Dept: LAB | Facility: HOSPITAL | Age: 54
End: 2024-08-23
Payer: COMMERCIAL

## 2024-08-23 DIAGNOSIS — E11.65 TYPE 2 DIABETES MELLITUS WITH HYPERGLYCEMIA, WITHOUT LONG-TERM CURRENT USE OF INSULIN: ICD-10-CM

## 2024-08-23 LAB
ALBUMIN SERPL-MCNC: 4.2 G/DL (ref 3.5–5.2)
ALBUMIN UR-MCNC: <1.2 MG/DL
ALBUMIN/GLOB SERPL: 1.9 G/DL
ALP SERPL-CCNC: 55 U/L (ref 39–117)
ALT SERPL W P-5'-P-CCNC: 19 U/L (ref 1–41)
ANION GAP SERPL CALCULATED.3IONS-SCNC: 6.4 MMOL/L (ref 5–15)
AST SERPL-CCNC: 16 U/L (ref 1–40)
BILIRUB SERPL-MCNC: 0.7 MG/DL (ref 0–1.2)
BUN SERPL-MCNC: 14 MG/DL (ref 6–20)
BUN/CREAT SERPL: 13.3 (ref 7–25)
CALCIUM SPEC-SCNC: 9.3 MG/DL (ref 8.6–10.5)
CHLORIDE SERPL-SCNC: 102 MMOL/L (ref 98–107)
CHOLEST SERPL-MCNC: 94 MG/DL (ref 0–200)
CO2 SERPL-SCNC: 27.6 MMOL/L (ref 22–29)
CREAT SERPL-MCNC: 1.05 MG/DL (ref 0.76–1.27)
CREAT UR-MCNC: 117.4 MG/DL
EGFRCR SERPLBLD CKD-EPI 2021: 83.8 ML/MIN/1.73
GLOBULIN UR ELPH-MCNC: 2.2 GM/DL
GLUCOSE SERPL-MCNC: 125 MG/DL (ref 65–99)
HBA1C MFR BLD: 7.44 % (ref 4.8–5.6)
HDLC SERPL-MCNC: 37 MG/DL (ref 40–60)
LDLC SERPL CALC-MCNC: 34 MG/DL (ref 0–100)
LDLC/HDLC SERPL: 0.85 {RATIO}
MICROALBUMIN/CREAT UR: NORMAL MG/G{CREAT}
POTASSIUM SERPL-SCNC: 4 MMOL/L (ref 3.5–5.2)
PROT SERPL-MCNC: 6.4 G/DL (ref 6–8.5)
SODIUM SERPL-SCNC: 136 MMOL/L (ref 136–145)
TRIGL SERPL-MCNC: 128 MG/DL (ref 0–150)
VLDLC SERPL-MCNC: 23 MG/DL (ref 5–40)

## 2024-08-23 PROCEDURE — 80061 LIPID PANEL: CPT

## 2024-08-23 PROCEDURE — 82570 ASSAY OF URINE CREATININE: CPT

## 2024-08-23 PROCEDURE — 82043 UR ALBUMIN QUANTITATIVE: CPT

## 2024-08-23 PROCEDURE — 80053 COMPREHEN METABOLIC PANEL: CPT

## 2024-08-23 PROCEDURE — 36415 COLL VENOUS BLD VENIPUNCTURE: CPT

## 2024-08-23 PROCEDURE — 83036 HEMOGLOBIN GLYCOSYLATED A1C: CPT

## 2024-08-27 ENCOUNTER — OFFICE VISIT (OUTPATIENT)
Dept: ENDOCRINOLOGY | Facility: CLINIC | Age: 54
End: 2024-08-27
Payer: COMMERCIAL

## 2024-08-27 VITALS
DIASTOLIC BLOOD PRESSURE: 75 MMHG | HEIGHT: 70 IN | SYSTOLIC BLOOD PRESSURE: 125 MMHG | OXYGEN SATURATION: 98 % | WEIGHT: 176 LBS | HEART RATE: 72 BPM | BODY MASS INDEX: 25.2 KG/M2

## 2024-08-27 DIAGNOSIS — E11.65 TYPE 2 DIABETES MELLITUS WITH HYPERGLYCEMIA, WITHOUT LONG-TERM CURRENT USE OF INSULIN: Primary | ICD-10-CM

## 2024-08-27 DIAGNOSIS — E55.9 VITAMIN D DEFICIENCY: ICD-10-CM

## 2024-08-27 DIAGNOSIS — E78.2 MIXED HYPERLIPIDEMIA: ICD-10-CM

## 2024-08-27 LAB — GLUCOSE BLDC GLUCOMTR-MCNC: 131 MG/DL (ref 70–105)

## 2024-08-27 PROCEDURE — 82948 REAGENT STRIP/BLOOD GLUCOSE: CPT | Performed by: INTERNAL MEDICINE

## 2024-08-27 NOTE — PROGRESS NOTES
Endocrine Progress Note Outpatient     Patient Care Team:  David Lazo PA-C as PCP - General (Physician Assistant)    Chief Complaint: Follow-up type 2 diabetes:    HPI: 54-year-old male with history of type 2 diabetes, hyperlipidemia and vitamin D deficiency is here for follow-up.    For type 2 diabetes he is currently on Janumet 50/1000, 2 tablets twice a day and Jardiance 25 mg p.o. daily.  He is tolerating his medications well.  His Janumet was changed from Janumet XR to Janumet plan and that caused him to take his Janumet twice a day which she was not used to and has missed some of his evening doses.  But he is now getting better added.    Hyperlipidemia: On atorvastatin    Vitamin D deficiency: On replacement.    Past Medical History:   Diagnosis Date    Frozen shoulder     Hyperlipidemia     Type 2 diabetes mellitus        Social History     Socioeconomic History    Marital status:      Spouse name: Sailaja    Number of children: 2    Years of education: 23   Tobacco Use    Smoking status: Never     Passive exposure: Never    Smokeless tobacco: Never   Vaping Use    Vaping status: Never Used   Substance and Sexual Activity    Alcohol use: No    Drug use: Never    Sexual activity: Defer       Family History   Problem Relation Age of Onset    Lupus Mother     Hypertension Mother     Cancer Mother         lung / breast / skin    Diabetes Father     Cancer Father         myeloma     Diabetes Brother        No Known Allergies    ROS:   Constitutional:  Denies fatigue, tiredness.    Eyes:  Denies change in visual acuity   HENT:  Denies nasal congestion or sore throat   Respiratory: denies cough, shortness of breath.   Cardiovascular:  denies chest pain, edema   GI:  Denies abdominal pain, nausea, vomiting.   Musculoskeletal:  Denies back pain or joint pain   Integument:  Denies dry skin and rash   Neurologic:  Denies headache, focal weakness or sensory changes   Endocrine:  Denies polyuria or  polydipsia   Psychiatric:  Denies depression or anxiety      Vitals:    08/27/24 0927   BP: 125/75   Pulse: 72   SpO2: 98%     BMI: 24.8  Physical Exam:  GEN: NAD, conversant  EYES: EOMI,   NECK: no thyromegaly,   CV: RRR,   LUNG: CTA  PSYCH: AOX3, appropriate mood, affect normal      Results Review:     I reviewed the patient's new clinical results.    Lab Results   Component Value Date    HGBA1C 7.44 (H) 08/23/2024    HGBA1C 6.50 (H) 02/09/2024    HGBA1C 6.60 (H) 05/05/2023      Lab Results   Component Value Date    GLUCOSE 125 (H) 08/23/2024    BUN 14 08/23/2024    CREATININE 1.05 08/23/2024    EGFRIFNONA 67 02/09/2022    BCR 13.3 08/23/2024    K 4.0 08/23/2024    CO2 27.6 08/23/2024    CALCIUM 9.3 08/23/2024    ALBUMIN 4.2 08/23/2024    LABIL2 1.6 03/01/2019    AST 16 08/23/2024    ALT 19 08/23/2024    CHOL 94 08/23/2024    TRIG 128 08/23/2024    LDL 34 08/23/2024    HDL 37 (L) 08/23/2024         Medication Review: Reviewed.       Current Outpatient Medications:     atorvastatin (LIPITOR) 10 MG tablet, TAKE ONE TABLET BY MOUTH DAILY, Disp: 90 tablet, Rfl: 4    Cyanocobalamin (Vitamin B-12) 1000 MCG sublingual tablet, PLACE ONE TABLET UNDER THE TONGUE DAILY, Disp: 90 each, Rfl: 1    glucose blood test strip, ONETOUCH ULTRA BLUE STRP, Disp: , Rfl:     Jardiance 25 MG tablet tablet, TAKE ONE TABLET BY MOUTH DAILY, Disp: 90 tablet, Rfl: 4    sitaGLIPtin-metFORMIN (Janumet)  MG per tablet, Take 1 tablet by mouth 2 (Two) Times a Day., Disp: 180 tablet, Rfl: 1    vitamin D3 125 MCG (5000 UT) capsule capsule, Take 1 capsule by mouth Daily., Disp: 100 capsule, Rfl: 4    promethazine-dextromethorphan (PROMETHAZINE-DM) 6.25-15 MG/5ML syrup, Take 5 mL by mouth At Night As Needed for Cough. (Patient not taking: Reported on 8/27/2024), Disp: 118 mL, Rfl: 0      Assessment & Plan   1.  Diabetes mellitus type 2 with Hyperglycemia: Uncontrolled with mild high A1c of 7.4% most likely due to missing of Janumet doses.  We  talked about changing him to GLP-1 agonist therapy but he is telling me that he is now found a new routine and is able to take his Janumet 1 tablet twice a day and would like to continue that for now along with Jardiance and see how he does by the next time.  Recommend to continue to work on diet and activity and always keep glucose source in case of low blood sugars.    2.  Hyperlipidemia: Well-controlled, will continue atorvastatin for now.    3.  Vitamin D deficiency: On vitamin D supplementation.    Assessment and plan from February 15, 2024 reviewed and updated.            Ino Jean MD FACE.    Much of the above report is an electronic transcription/translation of the spoken language to printed text using Dragon Software. As such, the subtleties and finesse of the spoken language may permit erroneous, or at times, nonsensical words or phrases to be inadvertently transcribed; thus changes may be made at a later date to rectify these errors.

## 2024-08-27 NOTE — PATIENT INSTRUCTIONS
Continue current management  Always keep glucose source in case of low blood sugar  Labs before follow-up  Continue to work on diet and activity.

## 2025-02-27 DIAGNOSIS — E11.65 TYPE 2 DIABETES MELLITUS WITH HYPERGLYCEMIA, WITHOUT LONG-TERM CURRENT USE OF INSULIN: Primary | ICD-10-CM

## 2025-02-28 RX ORDER — EMPAGLIFLOZIN 25 MG/1
25 TABLET, FILM COATED ORAL DAILY
Qty: 90 TABLET | Refills: 4 | Status: SHIPPED | OUTPATIENT
Start: 2025-02-28

## 2025-08-04 RX ORDER — ATORVASTATIN CALCIUM 10 MG/1
10 TABLET, FILM COATED ORAL DAILY
Qty: 90 TABLET | Refills: 4 | Status: SHIPPED | OUTPATIENT
Start: 2025-08-04